# Patient Record
Sex: FEMALE | Race: WHITE | NOT HISPANIC OR LATINO | Employment: OTHER | URBAN - METROPOLITAN AREA
[De-identification: names, ages, dates, MRNs, and addresses within clinical notes are randomized per-mention and may not be internally consistent; named-entity substitution may affect disease eponyms.]

---

## 2018-06-27 ENCOUNTER — HOSPITAL ENCOUNTER (OUTPATIENT)
Dept: CARDIOLOGY | Facility: HOSPITAL | Age: 83
Discharge: HOME | End: 2018-06-27
Attending: INTERNAL MEDICINE | Admitting: INTERNAL MEDICINE

## 2018-08-18 ENCOUNTER — HOSPITAL ENCOUNTER (OUTPATIENT)
Dept: URGENT CARE | Facility: CLINIC | Age: 83
Setting detail: SPECIMEN
Discharge: HOME | End: 2018-08-18
Attending: FAMILY MEDICINE | Admitting: FAMILY MEDICINE

## 2018-08-18 LAB
BACTERIA SPEC AEROBE CULT: NORMAL
Lab: NORMAL
MICRO REPORT STATUS: NORMAL
SPECIMEN SOURCE: NORMAL

## 2018-12-28 ENCOUNTER — HOSPITAL ENCOUNTER (OUTPATIENT)
Dept: OTHER | Facility: HOSPITAL | Age: 83
Discharge: HOME | End: 2018-12-28
Attending: OBSTETRICS & GYNECOLOGY | Admitting: OBSTETRICS & GYNECOLOGY

## 2018-12-28 LAB
ABO + RH BLD: NORMAL
ANION GAP SERPL CALC-SCNC: 12.8 MMOL/L (ref 10–20)
ARMBAND: NORMAL
BASOPHILS # BLD AUTO: 0.1 10*3/UL (ref 0–0.2)
BASOPHILS NFR BLD AUTO: 1 % (ref 0–2)
BILIRUB UR QL STRIP: NEGATIVE MG/DL
BLD COMPONENT TYPE: NORMAL
BLD GP AB SCN SERPL QL: NEGATIVE
BUN SERPL-MCNC: 20 MG/DL (ref 8–20)
BUN/CREAT SERPL: 15.4 (ref 5.4–26.2)
CALCIUM SERPL-MCNC: 9.1 MG/DL (ref 8.9–10.3)
CASTS URNS QL MICRO: NORMAL /[LPF]
CHLORIDE SERPL-SCNC: 104 MMOL/L (ref 101–111)
COLOR UR: YELLOW
CONV BACTERIA IN URINE MICRO: NEGATIVE
CONV CLARITY OF URINE: CLEAR
CONV CO2: 24 MMOL/L (ref 22–32)
CONV HYALINE CASTS IN URINE MICRO: 1 /[LPF] (ref 0–5)
CONV PROTEIN IN URINE BY AUTOMATED TEST STRIP: NEGATIVE MG/DL
CONV SMALL ROUND CELLS: NORMAL /[HPF]
CONV UROBILINOGEN IN URINE BY AUTOMATED TEST STRIP: 0.2 MG/DL
CREAT UR-MCNC: 1.3 MG/DL (ref 0.4–1)
CROSSMATCH EXPIRATION: NORMAL
CULTURE INDICATED?: NORMAL
DIFFERENTIAL METHOD BLD: (no result)
EOSINOPHIL # BLD AUTO: 0.3 10*3/UL (ref 0–0.3)
EOSINOPHIL # BLD AUTO: 6 % (ref 0–3)
ERYTHROCYTE [DISTWIDTH] IN BLOOD BY AUTOMATED COUNT: 13.7 % (ref 11.5–14.5)
GLUCOSE SERPL-MCNC: 89 MG/DL (ref 65–99)
GLUCOSE UR QL: NEGATIVE MG/DL
HCT VFR BLD AUTO: 40.5 % (ref 35–49)
HGB BLD-MCNC: 13.5 G/DL (ref 12–15)
HGB UR QL STRIP: NEGATIVE
KETONES UR QL STRIP: NEGATIVE MG/DL
LEUKOCYTE ESTERASE UR QL STRIP: NEGATIVE
LYMPHOCYTES # BLD AUTO: 1.2 10*3/UL (ref 0.8–4.8)
LYMPHOCYTES NFR BLD AUTO: 23 % (ref 18–42)
MCH RBC QN AUTO: 32.2 PG (ref 26–32)
MCHC RBC AUTO-ENTMCNC: 33.4 G/DL (ref 32–36)
MCV RBC AUTO: 96.5 FL (ref 80–94)
MONOCYTES # BLD AUTO: 0.5 10*3/UL (ref 0.1–1.3)
MONOCYTES NFR BLD AUTO: 10 % (ref 2–11)
NEUTROPHILS # BLD AUTO: 3.1 10*3/UL (ref 2.3–8.6)
NEUTROPHILS NFR BLD AUTO: 60 % (ref 50–75)
NITRITE UR QL STRIP: NEGATIVE
NRBC BLD AUTO-RTO: 0 /100{WBCS}
NRBC/RBC NFR BLD MANUAL: 0 10*3/UL
PH UR STRIP.AUTO: 5.5 [PH] (ref 4.5–8)
PLATELET # BLD AUTO: 193 10*3/UL (ref 150–450)
PMV BLD AUTO: 10 FL (ref 7.4–10.4)
POTASSIUM SERPL-SCNC: 3.8 MMOL/L (ref 3.6–5.1)
RBC # BLD AUTO: 4.2 10*6/UL (ref 4–5.4)
RBC #/AREA URNS HPF: 1 /[HPF] (ref 0–3)
SODIUM SERPL-SCNC: 137 MMOL/L (ref 136–144)
SP GR UR: 1.01 (ref 1–1.03)
SPERM URNS QL MICRO: NORMAL /[HPF]
SQUAMOUS SPT QL MICRO: 4 /[HPF] (ref 0–5)
UNIDENT CRYS URNS QL MICRO: NORMAL /[HPF]
WBC # BLD AUTO: 5.2 10*3/UL (ref 4.5–11.5)
WBC #/AREA URNS HPF: 0 /[HPF] (ref 0–5)
YEAST SPEC QL WET PREP: NORMAL /[HPF]

## 2019-01-10 ENCOUNTER — HOSPITAL ENCOUNTER (OUTPATIENT)
Dept: LAB | Facility: HOSPITAL | Age: 84
Discharge: HOME | End: 2019-01-10
Attending: ANESTHESIOLOGY | Admitting: ANESTHESIOLOGY

## 2019-01-10 LAB
ABO + RH BLD: NORMAL
ARMBAND: NORMAL
BLD COMPONENT TYPE: NORMAL
BLD GP AB SCN SERPL QL: NEGATIVE
CROSSMATCH EXPIRATION: NORMAL

## 2019-01-18 ENCOUNTER — HOSPITAL ENCOUNTER (OUTPATIENT)
Dept: OTHER | Facility: HOSPITAL | Age: 84
Setting detail: SPECIMEN
Discharge: HOME | End: 2019-01-18
Attending: OBSTETRICS & GYNECOLOGY | Admitting: OBSTETRICS & GYNECOLOGY

## 2019-01-18 LAB
BASOPHILS # BLD AUTO: 0 10*3/UL (ref 0–0.2)
BASOPHILS NFR BLD AUTO: 0 % (ref 0–2)
DIFFERENTIAL METHOD BLD: (no result)
EOSINOPHIL # BLD AUTO: 0 % (ref 0–3)
EOSINOPHIL # BLD AUTO: 0 10*3/UL (ref 0–0.3)
ERYTHROCYTE [DISTWIDTH] IN BLOOD BY AUTOMATED COUNT: 13.8 % (ref 11.5–14.5)
HCT VFR BLD AUTO: 35.4 % (ref 35–49)
HGB BLD-MCNC: 11.6 G/DL (ref 12–15)
LYMPHOCYTES # BLD AUTO: 1.1 10*3/UL (ref 0.8–4.8)
LYMPHOCYTES NFR BLD AUTO: 9 % (ref 18–42)
MCH RBC QN AUTO: 31.5 PG (ref 26–32)
MCHC RBC AUTO-ENTMCNC: 32.9 G/DL (ref 32–36)
MCV RBC AUTO: 95.8 FL (ref 80–94)
MONOCYTES # BLD AUTO: 0.8 10*3/UL (ref 0.1–1.3)
MONOCYTES NFR BLD AUTO: 7 % (ref 2–11)
NEUTROPHILS # BLD AUTO: 10.7 10*3/UL (ref 2.3–8.6)
NEUTROPHILS NFR BLD AUTO: 84 % (ref 50–75)
NRBC BLD AUTO-RTO: 0 /100{WBCS}
NRBC/RBC NFR BLD MANUAL: 0 10*3/UL
PLATELET # BLD AUTO: 173 10*3/UL (ref 150–450)
PMV BLD AUTO: 10.3 FL (ref 7.4–10.4)
RBC # BLD AUTO: 3.69 10*6/UL (ref 4–5.4)
WBC # BLD AUTO: 12.7 10*3/UL (ref 4.5–11.5)

## 2019-07-02 PROBLEM — E78.5 HYPERLIPIDEMIA: Status: ACTIVE | Noted: 2019-07-02

## 2019-07-02 PROBLEM — S61.411A LACERATION WITHOUT FOREIGN BODY OF RIGHT HAND, INITIAL ENCOUNTER: Status: ACTIVE | Noted: 2018-10-27

## 2019-07-02 PROBLEM — I25.10 CORONARY ARTERY DISEASE: Status: ACTIVE | Noted: 2019-07-02

## 2019-07-02 PROBLEM — I10 HYPERTENSION: Status: ACTIVE | Noted: 2019-07-02

## 2019-07-10 ENCOUNTER — APPOINTMENT (OUTPATIENT)
Dept: MRI IMAGING | Facility: HOSPITAL | Age: 84
End: 2019-07-10

## 2019-07-10 ENCOUNTER — APPOINTMENT (OUTPATIENT)
Dept: CT IMAGING | Facility: HOSPITAL | Age: 84
End: 2019-07-10

## 2019-07-10 ENCOUNTER — APPOINTMENT (OUTPATIENT)
Dept: GENERAL RADIOLOGY | Facility: HOSPITAL | Age: 84
End: 2019-07-10

## 2019-07-10 ENCOUNTER — HOSPITAL ENCOUNTER (OUTPATIENT)
Facility: HOSPITAL | Age: 84
Setting detail: OBSERVATION
Discharge: HOME | End: 2019-07-11
Attending: NURSE PRACTITIONER | Admitting: HOSPITALIST

## 2019-07-10 DIAGNOSIS — R42 DIZZY: Primary | ICD-10-CM

## 2019-07-10 DIAGNOSIS — R06.09 OTHER FORM OF DYSPNEA: ICD-10-CM

## 2019-07-10 DIAGNOSIS — R26.81 UNSTEADY GAIT: ICD-10-CM

## 2019-07-10 DIAGNOSIS — R29.898 WEAKNESS OF BOTH LOWER EXTREMITIES: ICD-10-CM

## 2019-07-10 PROBLEM — K21.9 GERD WITHOUT ESOPHAGITIS: Chronic | Status: ACTIVE | Noted: 2019-07-10

## 2019-07-10 PROBLEM — R73.9 ACUTE HYPERGLYCEMIA: Status: ACTIVE | Noted: 2019-07-10

## 2019-07-10 PROBLEM — R06.00 DYSPNEA: Status: ACTIVE | Noted: 2019-07-10

## 2019-07-10 PROBLEM — S61.411A LACERATION WITHOUT FOREIGN BODY OF RIGHT HAND, INITIAL ENCOUNTER: Status: RESOLVED | Noted: 2018-10-27 | Resolved: 2019-07-10

## 2019-07-10 PROBLEM — I10 HYPERTENSION: Chronic | Status: ACTIVE | Noted: 2019-07-02

## 2019-07-10 PROBLEM — E78.5 HYPERLIPIDEMIA: Chronic | Status: ACTIVE | Noted: 2019-07-02

## 2019-07-10 PROBLEM — N18.30 CKD (CHRONIC KIDNEY DISEASE) STAGE 3, GFR 30-59 ML/MIN: Chronic | Status: ACTIVE | Noted: 2019-07-10

## 2019-07-10 PROBLEM — K06.9: Chronic | Status: ACTIVE | Noted: 2019-07-10

## 2019-07-10 PROBLEM — L43.9: Chronic | Status: ACTIVE | Noted: 2019-07-10

## 2019-07-10 PROBLEM — I25.10 CORONARY ARTERY DISEASE: Chronic | Status: ACTIVE | Noted: 2019-07-02

## 2019-07-10 PROBLEM — E03.9 HYPOTHYROIDISM: Chronic | Status: ACTIVE | Noted: 2019-07-10

## 2019-07-10 LAB
ALBUMIN SERPL-MCNC: 3.7 G/DL (ref 3.5–4.8)
ALBUMIN/GLOB SERPL: 1.2 G/DL (ref 1–1.7)
ALP SERPL-CCNC: 66 U/L (ref 32–91)
ALT SERPL W P-5'-P-CCNC: 17 U/L (ref 14–54)
ANION GAP SERPL CALCULATED.3IONS-SCNC: 14.1 MMOL/L (ref 5–15)
ARTICHOKE IGE QN: 113 MG/DL (ref 0–100)
AST SERPL-CCNC: 21 U/L (ref 15–41)
BASOPHILS # BLD AUTO: 0.1 10*3/MM3 (ref 0–0.2)
BASOPHILS NFR BLD AUTO: 1.2 % (ref 0–1.5)
BILIRUB SERPL-MCNC: 0.6 MG/DL (ref 0.3–1.2)
BUN BLD-MCNC: 24 MG/DL (ref 8–20)
BUN/CREAT SERPL: 16 (ref 5.4–26.2)
CALCIUM SPEC-SCNC: 9 MG/DL (ref 8.9–10.3)
CHLORIDE SERPL-SCNC: 103 MMOL/L (ref 101–111)
CHOLEST SERPL-MCNC: 185 MG/DL
CO2 SERPL-SCNC: 23 MMOL/L (ref 22–32)
CREAT BLD-MCNC: 1.5 MG/DL (ref 0.4–1)
DEPRECATED RDW RBC AUTO: 46.4 FL (ref 37–54)
EOSINOPHIL # BLD AUTO: 0.3 10*3/MM3 (ref 0–0.4)
EOSINOPHIL NFR BLD AUTO: 5.5 % (ref 0.3–6.2)
ERYTHROCYTE [DISTWIDTH] IN BLOOD BY AUTOMATED COUNT: 14.1 % (ref 12.3–15.4)
GFR SERPL CREATININE-BSD FRML MDRD: 33 ML/MIN/1.73
GLOBULIN UR ELPH-MCNC: 3 GM/DL (ref 2.5–3.8)
GLUCOSE BLD-MCNC: 161 MG/DL (ref 65–99)
HCT VFR BLD AUTO: 40.5 % (ref 34–46.6)
HDLC SERPL QL: 3.19
HDLC SERPL-MCNC: 58 MG/DL
HGB BLD-MCNC: 13.5 G/DL (ref 12–15.9)
LDLC/HDLC SERPL: 1.86 {RATIO}
LIPASE SERPL-CCNC: 54 U/L (ref 22–51)
LYMPHOCYTES # BLD AUTO: 1 10*3/MM3 (ref 0.7–3.1)
LYMPHOCYTES NFR BLD AUTO: 19.3 % (ref 19.6–45.3)
MCH RBC QN AUTO: 31.1 PG (ref 26.6–33)
MCHC RBC AUTO-ENTMCNC: 33.3 G/DL (ref 31.5–35.7)
MCV RBC AUTO: 93.3 FL (ref 79–97)
MONOCYTES # BLD AUTO: 0.4 10*3/MM3 (ref 0.1–0.9)
MONOCYTES NFR BLD AUTO: 8.4 % (ref 5–12)
NEUTROPHILS # BLD AUTO: 3.3 10*3/MM3 (ref 1.7–7)
NEUTROPHILS NFR BLD AUTO: 65.6 % (ref 42.7–76)
NRBC BLD AUTO-RTO: 0.1 /100 WBC (ref 0–0.2)
PLATELET # BLD AUTO: 168 10*3/MM3 (ref 140–450)
PMV BLD AUTO: 9.3 FL (ref 6–12)
POTASSIUM BLD-SCNC: 4.1 MMOL/L (ref 3.6–5.1)
PROT SERPL-MCNC: 6.7 G/DL (ref 6.1–7.9)
RBC # BLD AUTO: 4.34 10*6/MM3 (ref 3.77–5.28)
SODIUM BLD-SCNC: 136 MMOL/L (ref 136–144)
TRIGL SERPL-MCNC: 97 MG/DL
TROPONIN I SERPL-MCNC: <0.03 NG/ML (ref 0–0.03)
VLDLC SERPL-MCNC: 19.4 MG/DL
WBC NRBC COR # BLD: 5 10*3/MM3 (ref 3.4–10.8)

## 2019-07-10 PROCEDURE — 99285 EMERGENCY DEPT VISIT HI MDM: CPT

## 2019-07-10 PROCEDURE — 80061 LIPID PANEL: CPT | Performed by: NURSE PRACTITIONER

## 2019-07-10 PROCEDURE — 99220 PR INITIAL OBSERVATION CARE/DAY 70 MINUTES: CPT | Performed by: NURSE PRACTITIONER

## 2019-07-10 PROCEDURE — 71045 X-RAY EXAM CHEST 1 VIEW: CPT

## 2019-07-10 PROCEDURE — 70551 MRI BRAIN STEM W/O DYE: CPT

## 2019-07-10 PROCEDURE — G0378 HOSPITAL OBSERVATION PER HR: HCPCS

## 2019-07-10 PROCEDURE — 94799 UNLISTED PULMONARY SVC/PX: CPT

## 2019-07-10 PROCEDURE — 96361 HYDRATE IV INFUSION ADD-ON: CPT

## 2019-07-10 PROCEDURE — 96372 THER/PROPH/DIAG INJ SC/IM: CPT

## 2019-07-10 PROCEDURE — 80053 COMPREHEN METABOLIC PANEL: CPT | Performed by: NURSE PRACTITIONER

## 2019-07-10 PROCEDURE — 84484 ASSAY OF TROPONIN QUANT: CPT | Performed by: NURSE PRACTITIONER

## 2019-07-10 PROCEDURE — 96360 HYDRATION IV INFUSION INIT: CPT

## 2019-07-10 PROCEDURE — 93005 ELECTROCARDIOGRAM TRACING: CPT | Performed by: NURSE PRACTITIONER

## 2019-07-10 PROCEDURE — 25010000002 HEPARIN (PORCINE) PER 1000 UNITS: Performed by: NURSE PRACTITIONER

## 2019-07-10 PROCEDURE — 97161 PT EVAL LOW COMPLEX 20 MIN: CPT

## 2019-07-10 PROCEDURE — 85025 COMPLETE CBC W/AUTO DIFF WBC: CPT | Performed by: NURSE PRACTITIONER

## 2019-07-10 PROCEDURE — 70450 CT HEAD/BRAIN W/O DYE: CPT

## 2019-07-10 PROCEDURE — 83690 ASSAY OF LIPASE: CPT | Performed by: NURSE PRACTITIONER

## 2019-07-10 RX ORDER — ONDANSETRON 2 MG/ML
4 INJECTION INTRAMUSCULAR; INTRAVENOUS EVERY 6 HOURS PRN
Status: DISCONTINUED | OUTPATIENT
Start: 2019-07-10 | End: 2019-07-11 | Stop reason: HOSPADM

## 2019-07-10 RX ORDER — MECLIZINE HYDROCHLORIDE 25 MG/1
25 TABLET ORAL 3 TIMES DAILY PRN
Status: DISCONTINUED | OUTPATIENT
Start: 2019-07-10 | End: 2019-07-11 | Stop reason: HOSPADM

## 2019-07-10 RX ORDER — SODIUM CHLORIDE 0.9 % (FLUSH) 0.9 %
3-10 SYRINGE (ML) INJECTION AS NEEDED
Status: DISCONTINUED | OUTPATIENT
Start: 2019-07-10 | End: 2019-07-11 | Stop reason: HOSPADM

## 2019-07-10 RX ORDER — MECLIZINE HYDROCHLORIDE 25 MG/1
50 TABLET ORAL ONCE
Status: COMPLETED | OUTPATIENT
Start: 2019-07-10 | End: 2019-07-10

## 2019-07-10 RX ORDER — SODIUM CHLORIDE 0.9 % (FLUSH) 0.9 %
3 SYRINGE (ML) INJECTION EVERY 12 HOURS SCHEDULED
Status: DISCONTINUED | OUTPATIENT
Start: 2019-07-10 | End: 2019-07-11 | Stop reason: HOSPADM

## 2019-07-10 RX ORDER — LEVOTHYROXINE SODIUM 0.05 MG/1
50 TABLET ORAL
Status: DISCONTINUED | OUTPATIENT
Start: 2019-07-11 | End: 2019-07-11 | Stop reason: HOSPADM

## 2019-07-10 RX ORDER — SACCHAROMYCES BOULARDII 250 MG
250 CAPSULE ORAL DAILY
Status: DISCONTINUED | OUTPATIENT
Start: 2019-07-10 | End: 2019-07-11 | Stop reason: HOSPADM

## 2019-07-10 RX ORDER — SIMVASTATIN 20 MG
20 TABLET ORAL NIGHTLY
COMMUNITY

## 2019-07-10 RX ORDER — RANITIDINE 150 MG/1
150 TABLET ORAL DAILY
COMMUNITY
End: 2019-12-11

## 2019-07-10 RX ORDER — ONDANSETRON 4 MG/1
4 TABLET, FILM COATED ORAL EVERY 6 HOURS PRN
Status: DISCONTINUED | OUTPATIENT
Start: 2019-07-10 | End: 2019-07-11 | Stop reason: HOSPADM

## 2019-07-10 RX ORDER — ASPIRIN 81 MG/1
81 TABLET, CHEWABLE ORAL DAILY
COMMUNITY

## 2019-07-10 RX ORDER — ACETAMINOPHEN 650 MG/1
650 SUPPOSITORY RECTAL EVERY 4 HOURS PRN
Status: DISCONTINUED | OUTPATIENT
Start: 2019-07-10 | End: 2019-07-11 | Stop reason: HOSPADM

## 2019-07-10 RX ORDER — SODIUM CHLORIDE 0.9 % (FLUSH) 0.9 %
10 SYRINGE (ML) INJECTION AS NEEDED
Status: DISCONTINUED | OUTPATIENT
Start: 2019-07-10 | End: 2019-07-11 | Stop reason: HOSPADM

## 2019-07-10 RX ORDER — NITROGLYCERIN 0.4 MG/1
0.4 TABLET SUBLINGUAL
Status: DISCONTINUED | OUTPATIENT
Start: 2019-07-10 | End: 2019-07-11 | Stop reason: HOSPADM

## 2019-07-10 RX ORDER — MULTIPLE VITAMINS W/ MINERALS TAB 9MG-400MCG
1 TAB ORAL DAILY
COMMUNITY

## 2019-07-10 RX ORDER — SACCHAROMYCES BOULARDII 250 MG
250 CAPSULE ORAL DAILY
COMMUNITY
End: 2020-07-08

## 2019-07-10 RX ORDER — ALUMINA, MAGNESIA, AND SIMETHICONE 2400; 2400; 240 MG/30ML; MG/30ML; MG/30ML
15 SUSPENSION ORAL EVERY 6 HOURS PRN
Status: DISCONTINUED | OUTPATIENT
Start: 2019-07-10 | End: 2019-07-11 | Stop reason: HOSPADM

## 2019-07-10 RX ORDER — SODIUM CHLORIDE 9 MG/ML
100 INJECTION, SOLUTION INTRAVENOUS CONTINUOUS
Status: DISCONTINUED | OUTPATIENT
Start: 2019-07-10 | End: 2019-07-11 | Stop reason: HOSPADM

## 2019-07-10 RX ORDER — ASPIRIN 81 MG/1
81 TABLET, CHEWABLE ORAL DAILY
Status: DISCONTINUED | OUTPATIENT
Start: 2019-07-10 | End: 2019-07-11 | Stop reason: HOSPADM

## 2019-07-10 RX ORDER — CHOLECALCIFEROL (VITAMIN D3) 125 MCG
5 CAPSULE ORAL NIGHTLY PRN
Status: DISCONTINUED | OUTPATIENT
Start: 2019-07-10 | End: 2019-07-11 | Stop reason: HOSPADM

## 2019-07-10 RX ORDER — ACETAMINOPHEN 325 MG/1
650 TABLET ORAL EVERY 4 HOURS PRN
Status: DISCONTINUED | OUTPATIENT
Start: 2019-07-10 | End: 2019-07-11 | Stop reason: HOSPADM

## 2019-07-10 RX ORDER — HEPARIN SODIUM 5000 [USP'U]/ML
5000 INJECTION, SOLUTION INTRAVENOUS; SUBCUTANEOUS EVERY 12 HOURS SCHEDULED
Status: DISCONTINUED | OUTPATIENT
Start: 2019-07-10 | End: 2019-07-11 | Stop reason: HOSPADM

## 2019-07-10 RX ORDER — ATENOLOL 25 MG/1
50 TABLET ORAL DAILY
COMMUNITY
End: 2022-05-05

## 2019-07-10 RX ORDER — FAMOTIDINE 20 MG/1
20 TABLET, FILM COATED ORAL DAILY
Status: DISCONTINUED | OUTPATIENT
Start: 2019-07-10 | End: 2019-07-11 | Stop reason: HOSPADM

## 2019-07-10 RX ORDER — ATORVASTATIN CALCIUM 10 MG/1
10 TABLET, FILM COATED ORAL NIGHTLY
Status: DISCONTINUED | OUTPATIENT
Start: 2019-07-10 | End: 2019-07-11 | Stop reason: HOSPADM

## 2019-07-10 RX ORDER — ATENOLOL 25 MG/1
25 TABLET ORAL 2 TIMES DAILY
Status: DISCONTINUED | OUTPATIENT
Start: 2019-07-10 | End: 2019-07-11 | Stop reason: HOSPADM

## 2019-07-10 RX ORDER — BISACODYL 10 MG
10 SUPPOSITORY, RECTAL RECTAL DAILY PRN
Status: DISCONTINUED | OUTPATIENT
Start: 2019-07-10 | End: 2019-07-11 | Stop reason: HOSPADM

## 2019-07-10 RX ADMIN — ACETAMINOPHEN 650 MG: 325 TABLET, FILM COATED ORAL at 23:34

## 2019-07-10 RX ADMIN — ATORVASTATIN CALCIUM 10 MG: 10 TABLET, FILM COATED ORAL at 20:13

## 2019-07-10 RX ADMIN — MECLIZINE HYDROCLORIDE 50 MG: 25 TABLET ORAL at 11:43

## 2019-07-10 RX ADMIN — HEPARIN SODIUM 5000 UNITS: 5000 INJECTION INTRAVENOUS; SUBCUTANEOUS at 20:13

## 2019-07-10 RX ADMIN — Medication 250 MG: at 15:24

## 2019-07-10 RX ADMIN — ATENOLOL 25 MG: 25 TABLET ORAL at 20:13

## 2019-07-10 RX ADMIN — SODIUM CHLORIDE 100 ML/HR: 900 INJECTION, SOLUTION INTRAVENOUS at 15:20

## 2019-07-10 RX ADMIN — ASPIRIN 81 MG 81 MG: 81 TABLET ORAL at 15:24

## 2019-07-10 NOTE — ED PROVIDER NOTES
Subjective   Patient is an 83-year-old female who states she woke up at 7:00 this morning feeling dizzy.  She states that she felt like the room was spinning and states that she ate a Sprite and a cracker and felt better she states her symptoms have resolved at this time.-He states that she felt fine yesterday and had appropriate eating and drinking.-He denies any chest pain or shortness of breath she denies headache.  He denies pain.            Review of Systems   Constitutional: Negative for chills, fatigue and fever.   HENT: Negative for congestion, tinnitus and trouble swallowing.    Eyes: Negative for photophobia, discharge and redness.   Respiratory: Negative for cough and shortness of breath.    Cardiovascular: Negative for chest pain and palpitations.   Gastrointestinal: Negative for abdominal pain, diarrhea, nausea and vomiting.   Genitourinary: Negative for dysuria, frequency and urgency.   Musculoskeletal: Negative for back pain, joint swelling and myalgias.   Skin: Negative for rash.   Neurological: Positive for dizziness. Negative for headaches.   Psychiatric/Behavioral: Negative for confusion.   All other systems reviewed and are negative.      Past Medical History:   Diagnosis Date   • Coronary artery disease    • Hyperlipidemia    • Hypertension    • Rotator cuff tear arthropathy of left shoulder     Left shoulder rotator tear        Allergies   Allergen Reactions   • Clindamycin Hcl Itching   • Tetracycline Itching       Past Surgical History:   Procedure Laterality Date   • BREAST MASS EXCISION Right     Breast tumor R   • CARDIAC CATHETERIZATION     • CHOLECYSTECTOMY     • ENDOSCOPY     • HEMORRHOIDECTOMY     • PARTIAL HYSTERECTOMY     • TONSILLECTOMY         Family History   Problem Relation Age of Onset   • Heart disease Sister         MI   • Hypertension Other    • Other Other         Bypass Surgery        Social History     Socioeconomic History   • Marital status:      Spouse name: Not  on file   • Number of children: Not on file   • Years of education: Not on file   • Highest education level: Not on file   Tobacco Use   • Smoking status: Never Smoker   Substance and Sexual Activity   • Alcohol use: No     Frequency: Never   • Drug use: No           Objective   Physical Exam   Constitutional: She is oriented to person, place, and time. She appears well-developed and well-nourished.   HENT:   Head: Normocephalic and atraumatic.   Eyes: Conjunctivae and EOM are normal. Pupils are equal, round, and reactive to light.   Neck: Normal range of motion. Neck supple.   Cardiovascular: Normal rate, regular rhythm, normal heart sounds and intact distal pulses.   Pulmonary/Chest: Effort normal and breath sounds normal. No respiratory distress. She has no wheezes.   Abdominal: Soft. Bowel sounds are normal. She exhibits no distension and no mass. There is no tenderness. There is no rebound and no guarding.   Musculoskeletal: Normal range of motion. She exhibits no deformity.   Neurological: She is alert and oriented to person, place, and time. She has normal strength. She displays normal reflexes. No cranial nerve deficit or sensory deficit. GCS eye subscore is 4. GCS verbal subscore is 5. GCS motor subscore is 6.   Skin: Skin is warm and dry. Capillary refill takes less than 2 seconds.   Psychiatric: She has a normal mood and affect. Her speech is normal and behavior is normal. Judgment and thought content normal. Cognition and memory are normal.       ECG 12 Lead    Date/Time: 7/10/2019 10:25 AM  Performed by: Janet Vargas APRN  Authorized by: Janet Vargas APRN   Interpreted by physician (Dr French)  Comparison: compared with previous ECG from 12/28/2018  Similar to previous ECG  Rhythm: sinus rhythm  Rate: normal  BPM: 68  QRS axis: normal  Conduction: conduction normal  ST Segments: ST segments normal  Clinical impression: normal ECG                 ED Course        Labs Reviewed    COMPREHENSIVE METABOLIC PANEL - Abnormal; Notable for the following components:       Result Value    Glucose 161 (*)     BUN 24 (*)     Creatinine 1.50 (*)     eGFR Non  Amer 33 (*)     All other components within normal limits    Narrative:     The MDRD GFR formula is only valid for adults with stable renal function between ages 18 and 70.   LIPASE - Abnormal; Notable for the following components:    Lipase 54 (*)     All other components within normal limits   CBC WITH AUTO DIFFERENTIAL - Abnormal; Notable for the following components:    Lymphocyte % 19.3 (*)     All other components within normal limits   TROPONIN (IN-HOUSE)   CBC AND DIFFERENTIAL    Narrative:     The following orders were created for panel order CBC & Differential.  Procedure                               Abnormality         Status                     ---------                               -----------         ------                     CBC Auto Differential[[id number]]        Abnormal            Final result                 Please view results for these tests on the individual orders.     Medications   sodium chloride 0.9 % flush 10 mL (not administered)   meclizine (ANTIVERT) tablet 50 mg (50 mg Oral Given 7/10/19 1143)     Ct Head Without Contrast    Result Date: 7/10/2019   1. No acute intracranial hemorrhage or mass effect mass effect. 2. Patchy periventricular and subcortical white matter low-attenuation, statistically representing age-indeterminate small vessel ischemic changes.  Electronically Signed By-Pop Heredia On:7/10/2019 9:35 AM This report was finalized on [id number] by  Pop Heredia, .    Xr Chest 1 View    Result Date: 7/10/2019  No acute cardiomegaly disease.  Electronically Signed By-Franklin Anne On:7/10/2019 9:14 AM This report was finalized on [id number] by  Franklin Anne, .              MDM    Patient had IV established and blood work was obtained labs and CAT scan were found to be within normal limits.   EKG was found to be within normal limits.-Patient continued to have dizziness upon standing and walking.-She will be admitted for further evaluation and treatment the patient and her family bedside were agreeable to this plan of care.    Patient  Care was discussed with Dr. Chester and Radha with the hospitalist and patient was agreeable to this plan of care.   Final diagnoses:   Dizzy   Other form of dyspnea            Janet Vargas, APRN  07/10/19 1242

## 2019-07-10 NOTE — PLAN OF CARE
Problem: Patient Care Overview  Goal: Plan of Care Review  Outcome: Ongoing (interventions implemented as appropriate)   07/10/19 1534   Coping/Psychosocial   Plan of Care Reviewed With patient;son   Plan of Care Review   Progress improving   OTHER   Outcome Summary Patient states minimal dizziness only with walking. Awaiting MRI     Goal: Individualization and Mutuality  Outcome: Ongoing (interventions implemented as appropriate)    Goal: Discharge Needs Assessment  Outcome: Ongoing (interventions implemented as appropriate)    Goal: Interprofessional Rounds/Family Conf  Outcome: Ongoing (interventions implemented as appropriate)      Problem: Fall Risk (Adult)  Goal: Identify Related Risk Factors and Signs and Symptoms  Outcome: Ongoing (interventions implemented as appropriate)   07/10/19 1534   Fall Risk (Adult)   Related Risk Factors (Fall Risk) age-related changes;gait/mobility problems   Signs and Symptoms (Fall Risk) presence of risk factors     Goal: Absence of Fall  Outcome: Ongoing (interventions implemented as appropriate)   07/10/19 1534   Fall Risk (Adult)   Absence of Fall making progress toward outcome       Problem: Activity Intolerance (Adult)  Goal: Identify Related Risk Factors and Signs and Symptoms  Outcome: Ongoing (interventions implemented as appropriate)    Goal: Activity Tolerance  Outcome: Ongoing (interventions implemented as appropriate)

## 2019-07-10 NOTE — PLAN OF CARE
Problem: Patient Care Overview  Goal: Plan of Care Review  Outcome: Ongoing (interventions implemented as appropriate)   07/10/19 2549   OTHER   Outcome Summary 84 y/o F who presented to ED this AM with report of imbalance and room spinning dizziness when she woke up this morning. Room spinning dizziness resolved within 20 min per report and has not recurred. Imbalance persists. Patient denies: ear fullness, ear pain, hearing loss, numbness, tingling, recent falls, history of imbalance, history of dizziness, upper respiratory infection. Denies lightheadedness. She lives alone, is indep. with ADLs and drives at baseline. Vision screen is (-). Based on balance assessment, patient relies heavily on vision for balance and also demonstrates postural instability with narrowing STEVEN. Patient with postural instability with dynamic gait. Not able to complete BPPV positional assessment due to pt needing to go to MRI. Will complete tomorrow AM. Recommding OPPT upon d/c due to decreased use of vestibular inputs placing her at risk for falls.

## 2019-07-10 NOTE — H&P
[facility] HOSPITALIST       PCP:  Gokul Piña MD  Cardiology:  Dr. Whittaker  Nephrology:  Dr. Webster  Urology:  Dr. Hartman     CHIEF COMPLAINT:     Unsteady gait, dizziness, and leg weakness      HISTORY OF PRESENT ILLNESS:    This is an 83-year-old  female with a history of CAD, HTN, HLD, hypothyroidism, and CKD who presented to the ED on 07/10/2019 with complaint of unsteady gait, dizziness, and leg weakness. The patient states she felt fine when she went to bed last night. She states this morning when she woke up and got up to go to the bathroom she was off balance. She denies falling. She states she lied back down because the room started to spin and she felt dizzy. She states after several minutes the dizziness past. She states when she tried to get back up her legs were very weak. She states her left leg felt weaker than her right leg. She denies any slurred speech, facial droop, headache, or visual disturbances. She denies fever, chills, chest pain, abdominal pain, nausea, vomiting, diarrhea, or urinary complaints. She reports shortness of breath with exertion, but states this has been intermittent for the past month. She states she had a normal stress test done last year. She also reports she had an ultrasound of her carotid arteries done at [facility] last year. She is unsure of the results. She denies a history of vertigo, orthostatic hypotension, or stroke.     Review of records:  6/27/18 stress Myoview negative for ischemia, EF 71%  2018 Carotid Duplex done at [facility]---report requested    Upon arrival to the ER the patient was given Antivert 50 mg PO. Her troponin was negative, EKG showed sinus rhythm, CT head showed no acute abnormalities, and CXR showed no acute findings. Her glucose was elevated at 161. Her BUN/Cr appear at baseline. She was admitted for further evaluation.       Past Medical History:   Diagnosis Date   • CKD (chronic kidney disease)  stage 3, GFR 30-59 ml/min (CMS/[facility])    • Coronary artery disease    • GERD (gastroesophageal reflux disease)    • Hyperlipidemia    • Hypertension    • Hypothyroidism    • Rotator cuff tear, left      Past Surgical History:   Procedure Laterality Date   • BREAST MASS EXCISION Right     Breast tumor R   • CARDIAC CATHETERIZATION     • CHOLECYSTECTOMY     • ENDOSCOPY     • HEMORRHOIDECTOMY     • PARTIAL HYSTERECTOMY     • SHOULDER ARTHROSCOPY W/ ROTATOR CUFF REPAIR Left    • TONSILLECTOMY       Family History   Problem Relation Age of Onset   • Heart disease Sister         MI   • Hypertension Other    • Other Other         Bypass Surgery      Social History     Tobacco Use   • Smoking status: Never Smoker   • Smokeless tobacco: Never Used   Substance Use Topics   • Alcohol use: No     Frequency: Never   • Drug use: No       Medications Prior to Admission   Medication Sig Dispense Refill Last Dose   • aspirin 81 MG chewable tablet Chew 81 mg Daily.   7/9/2019 at Unknown time   • atenolol (TENORMIN) 25 MG tablet Take 25 mg by mouth 2 (Two) Times a Day.   7/9/2019 at Unknown time   • calcium carbonate-vitamin d (CALTRATE 600+D) 600-400 MG-UNIT per tablet Take 1 tablet by mouth Daily.   7/9/2019 at Unknown time   • Coenzyme Q10 (COQ-10) 200 MG capsule Take 1 capsule by mouth Daily.   7/9/2019 at Unknown time   • conjugated estrogens (PREMARIN) 0.625 MG/GM vaginal cream Insert 1 g into the vagina 3 (Three) Times a Week. Mon,Wed,Fri 7/9/2019 at Unknown time   • levothyroxine (SYNTHROID, LEVOTHROID) 50 MCG tablet LEVOTHYROXINE SODIUM 50 MCG TABS   7/9/2019 at Unknown time   • Multiple Vitamins-Minerals (MULTIVITAMIN WITH MINERALS) tablet tablet Take 1 tablet by mouth Daily.   7/9/2019 at Unknown time   • raNITIdine (ZANTAC) 150 MG tablet Take 150 mg by mouth Daily.   7/9/2019 at Unknown time   • saccharomyces boulardii (FLORASTOR) 250 MG capsule Take 250 mg by mouth Daily.   7/9/2019 at Unknown time   • simvastatin (ZOCOR)  "20 MG tablet Take 20 mg by mouth Every Night.   7/9/2019 at Unknown time   • Tacrolimus (PROGRAF PO) Take 10 mL by mouth 4 (Four) Times a Day. Tacrolimus Liquid 1mg/1,000mL   Swish and Spit 10mL QID (pt usually uses BID-TID)  Verified per [facility] pharmacy where med is compounded. Instructed family to bring med.   7/9/2019 at Unknown time       Allergies:  Clindamycin hcl and Tetracycline    Immunization History   Administered Date(s) Administered   • Td 06/15/2012         REVIEW OF SYSTEMS:    Review of Systems   Constitutional: Negative for chills and fever.   Respiratory: Positive for shortness of breath.    Cardiovascular: Negative for chest pain.   Gastrointestinal: Negative for abdominal pain, diarrhea, nausea and vomiting.   Musculoskeletal: Positive for gait problem.   Neurological: Positive for dizziness and weakness.       Vital Signs  Temp:  [97.3 °F (36.3 °C)-97.6 °F (36.4 °C)] 97.6 °F (36.4 °C)  Heart Rate:  [65-84] 72  Resp:  [14-16] 16  BP: (146-180)/(60-94) 166/84    Flowsheet Rows      First Filed Value   Admission Height  165.1 cm (65\") Documented at 07/10/2019 0814   Admission Weight  72.7 kg (160 lb 4.4 oz) Documented at 07/10/2019 0814           Physical Exam:  Physical Exam   Constitutional: She is oriented to person, place, and time and well-developed, well-nourished, and in no distress.   HENT:   Head: Normocephalic.   Eyes: Conjunctivae and EOM are normal. Pupils are equal, round, and reactive to light.   Neck: Normal range of motion. Neck supple.   Cardiovascular: Normal rate, regular rhythm, normal heart sounds and intact distal pulses.   Pulmonary/Chest: Effort normal and breath sounds normal.   Abdominal: Soft. Bowel sounds are normal. She exhibits no distension. There is no tenderness.   Musculoskeletal: Normal range of motion. She exhibits no edema.   Neurological: She is alert and oriented to person, place, and time. She displays weakness. GCS score is 15.   Weakness of BLE, L>R.  "   Skin: Skin is warm and dry. No rash noted. No erythema.   Psychiatric: Mood, memory, affect and judgment normal.         Results Review:   I reviewed the patient's new clinical results.    Lab Results (most recent)     Procedure Component Value Units Date/Time    Troponin [[id number]]  (Normal) Collected:  07/10/19 1157    Specimen:  Blood Updated:  07/10/19 1253     Troponin I <0.030 ng/mL     Narrative:       Troponin I Reference Range:    0.00-0.03  Negative.  Repeat testing in 4-6 hours if clinically indicated.    0.04-0.29  Suspicious for myocardial injury. Serial measurements and clinical  correlation may be necessary to confirm or exclude diagnosis of acute  coronary syndrome.  Repeat testing in 4-6 hours if indicated.     >0.29 Consistent with myocardial injury.  Recommend clinical and laboratory correlation.     Results my be falsely decreased if patient taking Biotin.     Comprehensive Metabolic Panel [[id number]]  (Abnormal) Collected:  07/10/19 0845    Specimen:  Blood Updated:  07/10/19 0916     Glucose 161 mg/dL      BUN 24 mg/dL      Creatinine 1.50 mg/dL      Sodium 136 mmol/L      Potassium 4.1 mmol/L      Chloride 103 mmol/L      CO2 23.0 mmol/L      Calcium 9.0 mg/dL      Total Protein 6.7 g/dL      Albumin 3.70 g/dL      ALT (SGPT) 17 U/L      AST (SGOT) 21 U/L      Alkaline Phosphatase 66 U/L      Total Bilirubin 0.6 mg/dL      eGFR Non African Amer 33 mL/min/1.73      Globulin 3.0 gm/dL      A/G Ratio 1.2 g/dL      BUN/Creatinine Ratio 16.0     Anion Gap 14.1 mmol/L     Narrative:       The MDRD GFR formula is only valid for adults with stable renal function between ages 18 and 70.    Lipase [[id number]]  (Abnormal) Collected:  07/10/19 0845    Specimen:  Blood Updated:  07/10/19 0916     Lipase 54 U/L     CBC & Differential [[id number]] Collected:  07/10/19 0845    Specimen:  Blood Updated:  07/10/19 0853    Narrative:       The following orders were created for panel order CBC &  Differential.  Procedure                               Abnormality         Status                     ---------                               -----------         ------                     CBC Auto Differential[[id number]]        Abnormal            Final result                 Please view results for these tests on the individual orders.    CBC Auto Differential [[id number]]  (Abnormal) Collected:  07/10/19 0845    Specimen:  Blood Updated:  07/10/19 0853     WBC 5.00 10*3/mm3      RBC 4.34 10*6/mm3      Hemoglobin 13.5 g/dL      Hematocrit 40.5 %      MCV 93.3 fL      MCH 31.1 pg      MCHC 33.3 g/dL      RDW 14.1 %      RDW-SD 46.4 fl      MPV 9.3 fL      Platelets 168 10*3/mm3      Neutrophil % 65.6 %      Lymphocyte % 19.3 %      Monocyte % 8.4 %      Eosinophil % 5.5 %      Basophil % 1.2 %      Neutrophils, Absolute 3.30 10*3/mm3      Lymphocytes, Absolute 1.00 10*3/mm3      Monocytes, Absolute 0.40 10*3/mm3      Eosinophils, Absolute 0.30 10*3/mm3      Basophils, Absolute 0.10 10*3/mm3      nRBC 0.1 /100 WBC           Imaging Results (most recent)     Procedure Component Value Units Date/Time    CT Head Without Contrast [[id number]] Collected:  07/10/19 0933     Updated:  07/10/19 0937    Narrative:       CT HEAD WO CONTRAST-     Date of Exam: 7/10/2019 9:24 AM     Indication: dizzy.     Comparison: None available.     Technique:  Without contrast, contiguous axial CT images of the head  were obtained from skull base to vertex.  Coronal and sagittal  reconstructions were performed.  Automated exposure control and  iterative reconstruction methods were used.     FINDINGS  No acute intracranial hemorrhage or mass/mass effect. The ventricles and  sulci are appropriate for age. The basilar cisterns are patent. Patchy  periventricular and subcortical white matter low-attenuation,  statistically representing age-indeterminate small vessel ischemic  changes. Gray-white matter differentiation is otherwise  grossly  maintained. Intracranial vascular calcifications, consistent with  atherosclerotic disease. Evidence of bilateral cataract surgery. Limited  imaging of the previous sinuses and mastoid air cells is unremarkable.  No acute osseous abnormality is identified.       Impression:          1. No acute intracranial hemorrhage or mass effect mass effect.  2. Patchy periventricular and subcortical white matter low-attenuation,  statistically representing age-indeterminate small vessel ischemic  changes.     Electronically Signed By-Pop Heredia On:7/10/2019 9:35 AM  This report was finalized on [id number] by  Pop Heredia, .    XR Chest 1 View [[id number]] Collected:  07/10/19 0912     Updated:  07/10/19 0916    Narrative:       DATE OF EXAM:  7/10/2019 9:03 AM     PROCEDURE:  XR CHEST 1 VW-     INDICATIONS:  dizzy     COMPARISON:  12/20/2018     TECHNIQUE:   Single radiographic AP view of the chest was obtained.     FINDINGS:  Heart size and pulmonary vessels are within normal limits. Lungs are  clear bilaterally. There are no pleural effusions. Bony structures are  unremarkable.        Impression:       No acute cardiomegaly disease.     Electronically Signed By-Franklin Anne On:7/10/2019 9:14 AM  This report was finalized on [id number] by  Franklin Anne, .            ECG/EMG Results (most recent)     Procedure Component Value Units Date/Time    ECG 12 Lead [[id number]] Collected:  07/10/19 0825     Updated:  07/10/19 0829    Narrative:       HEART RATE= 68  bpm  RR Interval= 880  ms  LA Interval= 149  ms  P Horizontal Axis= 52  deg  P Front Axis= 45  deg  QRSD Interval= 90  ms  QT Interval= 391  ms  QRS Axis= -11  deg  T Wave Axis= 28  deg  - NORMAL ECG -  Sinus rhythm  Electronically Signed By:   Date and Time of Study: 2019-07-10 08:25:34    ECG 12 Lead [[id number]] Resulted:  07/10/19 1247     Updated:  07/10/19 1247               Assessment/Plan       Unsteady gait    Dizzy    Weakness of both lower  extremities    Dyspnea on exertion    Acute hyperglycemia    Coronary artery disease    Hyperlipidemia    Hypertension    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    Hypothyroidism    GERD without esophagitis    CKD (chronic kidney disease) stage 3, GFR 30-59 ml/min (CMS/HCC)      Unsteady gait with dizziness and weakness of both lower extremities  -LLE weakness > RLE  -CTH:  No acute findings  -obtain MRI brain to r/o CVA  -copy of carotid doppler report done at [facility] requested  -check orthostatic vital signs  -serial troponin   -meclizine TID PRN  -fall risk precautions  -PT to eval and treat     Dyspnea on exertion  -etiology unclear  -6/2018 stress Myoview negative  -troponin and EKG in ER negative    Acute hyperglycemia without h/o DM  -non-fasting glucose  -check A1c  -accu checks AC&HS    Coronary artery disease / Hyperlipidemia / Essential Hypertension, chronic, controlled  -continue home aspirin, atenolol, and statin    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

## 2019-07-10 NOTE — THERAPY EVALUATION
Acute Care - Physical Therapy Initial Evaluation   [facility]     Patient Name: Lucy Macias  : 1935  MRN: [id number]  Today's Date: 7/10/2019                Admit Date: 7/10/2019    Visit Dx:     ICD-10-CM ICD-9-CM   1. Dizzy R42 780.4   2. 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   • Hypothyroidism   • GERD without esophagitis   • CKD (chronic kidney disease) stage 3, GFR 30-59 ml/min (CMS/HCC)   • Acute hyperglycemia     Past Medical History:   Diagnosis Date   • CKD (chronic kidney disease) stage 3, GFR 30-59 ml/min (CMS/HCC)    • Coronary artery disease    • GERD (gastroesophageal reflux disease)    • Hyperlipidemia    • Hypertension    • Hypothyroidism    • Rotator cuff tear, left      Past Surgical History:   Procedure Laterality Date   • BREAST MASS EXCISION Right     Breast tumor R   • CARDIAC CATHETERIZATION     • CHOLECYSTECTOMY     • ENDOSCOPY     • HEMORRHOIDECTOMY     • PARTIAL HYSTERECTOMY     • SHOULDER ARTHROSCOPY W/ ROTATOR CUFF REPAIR Left    • TONSILLECTOMY          PT ASSESSMENT (last 12 hours)      Physical Therapy Evaluation     Row Name 07/10/19 1546          PT Evaluation Time/Intention    Comment  Patient reports that this morning when she woke up, she walked to the bathroom and she was off balance. When she laid back down, the room was spinning. This lasted 20 min and she has not experienced the spinning sensation again. Denies having any type of dizziness prior. Denies any recent falls. Denies any upper respiratory issues. Denies numbness/tingling. Denies ringing in ears, fullness, ear pain, or hearing loss.   -SS     Row Name 07/10/19 1546          General Information    Patient/Family Observations  supine in bed, son and grandson present   -     Prior Level of Function  independent:;driving;home management;ADL's;gait;community mobility plays cards multiple times a week  -     Pertinent History of Current Functional Problem  82 y/o F who presented to ED following episode of imbalance and room spinning dizziness this AM.   -[facility] Name 07/10/19 1546          Relationship/Environment    Lives With  alone  -SS     Row Name 07/10/19 1546          Resource/Environmental Concerns    Current Living Arrangements  home/apartment/condo 1 story with basement, no INDIO  -[facility] Name 07/10/19 1546          Cognitive Assessment/Intervention- PT/OT    Orientation Status (Cognition)  oriented x 4  -[facility] Name 07/10/19 1546          Bed Mobility Assessment/Treatment    Comment (Bed Mobility)  performs supine to sit with SBA  -[facility] Name 07/10/19 1546          Transfer Assessment/Treatment    Comment (Transfers)  Transfers with CGA  -SS     Row Name 07/10/19 1546          Gait/Stairs Assessment/Training    [place] Level (Gait)  contact guard  -     Distance in Feet (Gait)  200  -     Comment (Gait/Stairs)  mild postural instability with typical gait; moderate postural instability with turns and head turns, significant postural instabilty with head nods. Decreased gait speed as a compensation. Significant difficulty with backward gait. Veering to R with eyes closed gait.   -     Row Name 07/10/19 1546          General ROM    GENERAL ROM COMMENTS  grossly WFL  -SS     Row Name 07/10/19 1546          MMT (Manual Muscle Testing)    General MMT Comments  UEs 4/5; LEs 4/5 per seated strength assessment  -[facility] Name 07/10/19 1546          Motor Assessment/Intervention    Additional Documentation  Balance (Group);Fine Motor Testing & Training (Group);Gross Motor Coordination (Group)  -[facility] Name 07/10/19 1546          Gross Motor Coordination    Gross Motor Skill, Impairments Detail  normal LE rapid alternating movements, increased time  required for finger to nose test   -SS     Row Name 07/10/19 1546          Balance    Balance  dynamic standing balance;static standing balance  -SS     Row Name 07/10/19 1546          Static Standing Balance    Comment (Unsupported Standing, Static Balance)  typical STEVEN eyes open: normal; typical STEVEN eyes closed: normal; narrow STEVEN eyes open: increased posural sway; narrow STEVEN eyes closed: loss of balance in 5 sec, able to recover with min A  -SS     Row Name 07/10/19 1546          Dynamic Standing Balance    Comment, Resists Mild Perturbations (Sitting, Dynamic Balance)  tandem stance immediate LOB bilaterally, increased difficulty with staggered stance with LOB in 10 sec  -SS     Row Name 07/10/19 1546          Sensory Assessment/Intervention    Sensory General Assessment  -- grossly intact and equal to light touch  -SS     Row Name 07/10/19 1546          Vision Assessment/Intervention    Vision Assessment Comment  smooth pursuit WNL; saccades- difficulty with attention; VOR normal; head trust test normal; head shaking nystagmus test normal  -SS     Row Name 07/10/19 1546          Pain Assessment    Additional Documentation  -- denies pain   -SS     Row Name 07/10/19 1546          Plan of Care Review    Plan of Care Reviewed With  patient;family  -SS     Row Name 07/10/19 1546          Physical Therapy Clinical Impression    Criteria for Skilled Interventions Met (PT Clinical Impression)  yes;treatment indicated  -     Impairments Found (describe specific impairments)  gait, locomotion, and balance  -     Rehab Potential (PT Clinical Summary)  good, to achieve stated therapy goals  -SS     Row Name 07/10/19 1546          Physical Therapy Goals    Transfer Goal Selection (PT)  transfer, PT goal 1  -     Gait Training Goal Selection (PT)  gait training, PT goal 1  -SS     Row Name 07/10/19 1546          Transfer Goal 1 (PT)    Activity/Assistive Device (Transfer Goal 1, PT)  transfers, all  -      [place]inson Level/Cues Needed (Transfer Goal 1, PT)  independent  -SS     Time Frame (Transfer Goal 1, PT)  long term goal (LTG);2 weeks  -     Row Name 07/10/19 1546          Gait Training Goal 1 (PT)    Activity/Assistive Device (Gait Training Goal 1, PT)  gait (walking locomotion)  -     [place]inson Level (Gait Training Goal 1, PT)  independent  -SS     Distance (Gait Goal 1, PT)  300  -SS     Time Frame (Gait Training Goal 1, PT)  long term goal (LTG);2 weeks  -       User Key  (r) = Recorded By, (t) = Taken By, (c) = Cosigned By    Initials Name Provider Type    SS Francine Milan, PT Physical Therapist        Physical Therapy Education     Title: PT OT SLP Therapies (Done)     Topic: Physical Therapy (Done)     Point: Mobility training (Done)     Learning Progress Summary           Patient Acceptance, E, VU by  at 7/10/2019  5:10 PM                               User Key     Initials Effective Dates Name Provider Type Discipline     06/19/19 -  Francine Milan, PT Physical Therapist PT              PT Recommendation and Plan  Anticipated Discharge Disposition (PT): home with OP services  Planned Therapy Interventions (PT Eval): balance training, gait training, home exercise program, neuromuscular re-education, patient/family education, vestibular therapy, transfer training, strengthening  Therapy Frequency (PT Clinical Impression): daily  Outcome Summary/Treatment Plan (PT)  Anticipated Discharge Disposition (PT): home with OP services  Plan of Care Reviewed With: patient, family  Outcome Summary: 82 y/o F who presented to ED this AM with report of imbalance and room spinning dizziness when she woke up this morning. Room spinning dizziness resolved within 20 min per report and has not recurred. Imbalance persists. Patient denies: ear fullness, ear pain, hearing loss, numbness, tingling, recent falls, history of imbalance, history of dizziness, upper respiratory infection. Denies  lightheadedness. She lives alone, is indep. with ADLs and drives at baseline. Vision screen is (-). Based on balance assessment, patient relies heavily on vision for balance and also demonstrates postural instability with narrowing STEVEN. Patient with postural instability with dynamic gait. Not able to complete BPPV positional assessment due to pt needing to go to MRI. Will complete tomorrow AM. Recommding OPPT upon d/c due to decreased use of vestibular inputs placing her at risk for falls.      Time Calculation:   PT Charges     Row Name 07/10/19 1710             Time Calculation    Start Time  1540  -SS      Stop Time  1625  -SS      Time Calculation (min)  45 min  -SS      PT Received On  07/10/19  -      PT - Next Appointment  07/11/19  -      PT Goal Re-Cert Due Date  07/24/19  -        User Key  (r) = Recorded By, (t) = Taken By, (c) = Cosigned By    Initials Name Provider Type    SS Francine Milan, PT Physical Therapist        Therapy Charges for Today     Code Description Service Date Service Provider Modifiers Qty    [id number] HC PT EVAL LOW COMPLEXITY 4 7/10/2019 Francine Milan, PT GP 1                 Francine Milan PT  7/10/2019

## 2019-07-11 VITALS
HEIGHT: 65 IN | OXYGEN SATURATION: 95 % | SYSTOLIC BLOOD PRESSURE: 148 MMHG | WEIGHT: 171.08 LBS | BODY MASS INDEX: 28.5 KG/M2 | HEART RATE: 68 BPM | DIASTOLIC BLOOD PRESSURE: 79 MMHG | TEMPERATURE: 98 F | RESPIRATION RATE: 18 BRPM

## 2019-07-11 PROBLEM — R06.09 DYSPNEA ON EXERTION: Status: RESOLVED | Noted: 2019-07-10 | Resolved: 2019-07-11

## 2019-07-11 PROBLEM — R29.898 WEAKNESS OF BOTH LOWER EXTREMITIES: Status: RESOLVED | Noted: 2019-07-10 | Resolved: 2019-07-11

## 2019-07-11 PROBLEM — R42 DIZZY: Status: RESOLVED | Noted: 2019-07-10 | Resolved: 2019-07-11

## 2019-07-11 LAB
ANION GAP SERPL CALCULATED.3IONS-SCNC: 11.5 MMOL/L (ref 5–15)
BASOPHILS # BLD AUTO: 0.1 10*3/MM3 (ref 0–0.2)
BASOPHILS NFR BLD AUTO: 1.7 % (ref 0–1.5)
BUN BLD-MCNC: 21 MG/DL (ref 8–20)
BUN/CREAT SERPL: 15 (ref 5.4–26.2)
CALCIUM SPEC-SCNC: 8.6 MG/DL (ref 8.9–10.3)
CHLORIDE SERPL-SCNC: 109 MMOL/L (ref 101–111)
CO2 SERPL-SCNC: 23 MMOL/L (ref 22–32)
CREAT BLD-MCNC: 1.4 MG/DL (ref 0.4–1)
DEPRECATED RDW RBC AUTO: 47.7 FL (ref 37–54)
EOSINOPHIL # BLD AUTO: 0.3 10*3/MM3 (ref 0–0.4)
EOSINOPHIL NFR BLD AUTO: 3.8 % (ref 0.3–6.2)
ERYTHROCYTE [DISTWIDTH] IN BLOOD BY AUTOMATED COUNT: 14.2 % (ref 12.3–15.4)
GFR SERPL CREATININE-BSD FRML MDRD: 36 ML/MIN/1.73
GLUCOSE BLD-MCNC: 108 MG/DL (ref 65–99)
HBA1C MFR BLD: 5.4 % (ref 3.5–5.6)
HCT VFR BLD AUTO: 38.8 % (ref 34–46.6)
HGB BLD-MCNC: 12.9 G/DL (ref 12–15.9)
LYMPHOCYTES # BLD AUTO: 1.9 10*3/MM3 (ref 0.7–3.1)
LYMPHOCYTES NFR BLD AUTO: 27.6 % (ref 19.6–45.3)
MCH RBC QN AUTO: 31.2 PG (ref 26.6–33)
MCHC RBC AUTO-ENTMCNC: 33.3 G/DL (ref 31.5–35.7)
MCV RBC AUTO: 93.8 FL (ref 79–97)
MONOCYTES # BLD AUTO: 0.6 10*3/MM3 (ref 0.1–0.9)
MONOCYTES NFR BLD AUTO: 9 % (ref 5–12)
NEUTROPHILS # BLD AUTO: 4 10*3/MM3 (ref 1.7–7)
NEUTROPHILS NFR BLD AUTO: 57.9 % (ref 42.7–76)
NRBC BLD AUTO-RTO: 0.2 /100 WBC (ref 0–0.2)
PLATELET # BLD AUTO: 170 10*3/MM3 (ref 140–450)
PMV BLD AUTO: 9.5 FL (ref 6–12)
POTASSIUM BLD-SCNC: 4.5 MMOL/L (ref 3.6–5.1)
RBC # BLD AUTO: 4.14 10*6/MM3 (ref 3.77–5.28)
SODIUM BLD-SCNC: 139 MMOL/L (ref 136–144)
WBC NRBC COR # BLD: 6.9 10*3/MM3 (ref 3.4–10.8)

## 2019-07-11 PROCEDURE — 83036 HEMOGLOBIN GLYCOSYLATED A1C: CPT | Performed by: NURSE PRACTITIONER

## 2019-07-11 PROCEDURE — 97116 GAIT TRAINING THERAPY: CPT

## 2019-07-11 PROCEDURE — 85025 COMPLETE CBC W/AUTO DIFF WBC: CPT | Performed by: NURSE PRACTITIONER

## 2019-07-11 PROCEDURE — 25010000002 HEPARIN (PORCINE) PER 1000 UNITS: Performed by: NURSE PRACTITIONER

## 2019-07-11 PROCEDURE — G0378 HOSPITAL OBSERVATION PER HR: HCPCS

## 2019-07-11 PROCEDURE — 99217 PR OBSERVATION CARE DISCHARGE MANAGEMENT: CPT | Performed by: HOSPITALIST

## 2019-07-11 PROCEDURE — 96361 HYDRATE IV INFUSION ADD-ON: CPT

## 2019-07-11 PROCEDURE — 96372 THER/PROPH/DIAG INJ SC/IM: CPT

## 2019-07-11 PROCEDURE — 80048 BASIC METABOLIC PNL TOTAL CA: CPT | Performed by: NURSE PRACTITIONER

## 2019-07-11 RX ORDER — MECLIZINE HYDROCHLORIDE 25 MG/1
25 TABLET ORAL 3 TIMES DAILY PRN
Qty: 30 TABLET | Refills: 0 | Status: SHIPPED | OUTPATIENT
Start: 2019-07-11 | End: 2020-07-08

## 2019-07-11 RX ADMIN — Medication 250 MG: at 08:59

## 2019-07-11 RX ADMIN — SODIUM CHLORIDE 100 ML/HR: 900 INJECTION, SOLUTION INTRAVENOUS at 01:38

## 2019-07-11 RX ADMIN — ASPIRIN 81 MG 81 MG: 81 TABLET ORAL at 08:56

## 2019-07-11 RX ADMIN — ATENOLOL 25 MG: 25 TABLET ORAL at 08:56

## 2019-07-11 RX ADMIN — HEPARIN SODIUM 5000 UNITS: 5000 INJECTION INTRAVENOUS; SUBCUTANEOUS at 08:57

## 2019-07-11 RX ADMIN — LEVOTHYROXINE SODIUM 50 MCG: 50 TABLET ORAL at 06:42

## 2019-07-11 RX ADMIN — Medication 3 ML: at 08:59

## 2019-07-11 NOTE — DISCHARGE SUMMARY
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    Hypothyroidism    GERD without esophagitis    CKD (chronic kidney disease) stage 3, GFR 30-59 ml/min (CMS/HCC)        Unsteady gait with dizziness likely secondary to BPPV, improved    -CT head:  No acute findings  -MRI brain negative for acute intracranial abnormality  -carotid doppler report done at [facility] 1/13/2018 reviewed; negative bilaterally  -orthostatic vital signs were normal (lying 148/79, sitting 159/84, standing 156/74)  -meclizine TID PRN  -PT eval and treat  recommended outpatient PT     Dyspnea on exertion, resolved; likely secondary to above  -Acute coronary syndrome ruled out with native serial troponins and EKG   -6/2018 stress Myoview negative  -No further cardiac work-up indicated at this time     Acute hyperglycemia in a non-fasting glucose without h/o DM  -A1c 5.4  -Blood sugar subsequently normal     Coronary artery disease / Hyperlipidemia / Essential Hypertension, chronic, controlled  -continue home aspirin, atenolol, and statin     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  -on tacrolimus oral rinse     Hypothyroidism  -continue home levothyroxine     GERD without esophagitis  -on ranitidine      CKD stage 3  -baseline Cr 1.3-1.6 and current creatinine 1.4             Presenting Problem/History of Present Illness  Active Hospital Problems    Diagnosis  POA   • Unsteady gait [R26.81]  Yes     Priority: High   • Hypothyroidism [E03.9]  Yes   • CKD (chronic kidney disease) stage 3, GFR 30-59 ml/min (CMS/HCC) [N18.3]  Yes   • Acute hyperglycemia [R73.9]  Yes   • Hypertension [I10]  Yes   • Coronary artery disease [I25.10]  Yes   • Hyperlipidemia [E78.5]  Yes       Resolved Hospital Problems    Diagnosis Date Resolved POA   • **Dizzy [R42] 07/11/2019 Yes     Priority: High   • Weakness of both lower extremities [R29.898] 07/11/2019 Yes   • Dyspnea on exertion [R06.09] 07/11/2019 Yes        From the HPI: This is an 83-year-old  female with a history of CAD, HTN, HLD, hypothyroidism, and CKD who presented to the ED on 07/10/2019 with complaint of unsteady gait, dizziness, and leg weakness. The patient states she felt fine when she went to bed last night. She states this morning when she woke up and got up to go to the bathroom she was off balance. She denies falling. She states she lied back down because the room started to spin and she felt dizzy. She states after several minutes the dizziness past. She states when she tried to get back up her legs were very weak. She states her left leg felt weaker than her right leg. She denies any slurred speech, facial droop, headache, or visual disturbances. She denies fever, chills, chest pain, abdominal pain, nausea, vomiting, diarrhea, or urinary complaints. She reports shortness of breath with exertion, but states this has been intermittent for the past month. She states she had a normal stress test done last year. She also reports she had an ultrasound of her carotid arteries done at [facility] last year. She is unsure of the results. She denies a history of vertigo, orthostatic hypotension, or stroke.      Review of records:  6/27/18 stress Myoview negative for ischemia, EF 71%  2018 Carotid Duplex done at [facility]---report requested     Upon arrival to the ER the patient was given Antivert 50 mg PO. Her troponin was negative, EKG showed sinus rhythm, CT head showed no acute abnormalities, and CXR showed no acute findings. Her glucose was elevated at 161. Her BUN/Cr appear at baseline. She was admitted for further evaluation.      Hospital course: Patient had resolution of her dizziness after receiving  meclizine in the emergency department.  She reported initially still feeling generalized weakness in both lower extremities but this gradually resolved to the hospital stay.  Physical therapy saw the patient and recommended outpatient evaluation.  The patient is feeling much better and is felt to be stable for discharge at this time.    Past Medical History:     Past Medical History:   Diagnosis Date   • CKD (chronic kidney disease) stage 3, GFR 30-59 ml/min (CMS/[facility])    • Coronary artery disease    • GERD (gastroesophageal reflux disease)    • Hyperlipidemia    • Hypertension    • Hypothyroidism    • Rotator cuff tear, left        Past Surgical History:     Past Surgical History:   Procedure Laterality Date   • BREAST MASS EXCISION Right     Breast tumor R   • CARDIAC CATHETERIZATION     • CHOLECYSTECTOMY     • ENDOSCOPY     • HEMORRHOIDECTOMY     • PARTIAL HYSTERECTOMY     • SHOULDER ARTHROSCOPY W/ ROTATOR CUFF REPAIR Left    • TONSILLECTOMY         Social History:   Social History     Socioeconomic History   • Marital status:      Spouse name: Not on file   • Number of children: Not on file   • Years of education: Not on file   • Highest education level: Not on file   Tobacco Use   • Smoking status: Never Smoker   • Smokeless tobacco: Never Used   Substance and Sexual Activity   • Alcohol use: No     Frequency: Never   • Drug use: No   • Sexual activity: Defer       Procedures Performed         Consults:   Consults     No orders found for last 30 day(s).          Pertinent Test Results:     Lab Results (last 72 hours)     Procedure Component Value Units Date/Time    Basic Metabolic Panel [[id number]]  (Abnormal) Collected:  07/11/19 0200    Specimen:  Blood Updated:  07/11/19 0244     Glucose 108 mg/dL      BUN 21 mg/dL      Creatinine 1.40 mg/dL      Sodium 139 mmol/L      Potassium 4.5 mmol/L      Chloride 109 mmol/L      CO2 23.0 mmol/L      Calcium 8.6 mg/dL      eGFR Non African Amer 36 mL/min/1.73       BUN/Creatinine Ratio 15.0     Anion Gap 11.5 mmol/L     Narrative:       The MDRD GFR formula is only valid for adults with stable renal function between ages 18 and 70.    CBC Auto Differential [[id number]]  (Abnormal) Collected:  07/11/19 0200    Specimen:  Blood Updated:  07/11/19 0227     WBC 6.90 10*3/mm3      RBC 4.14 10*6/mm3      Hemoglobin 12.9 g/dL      Hematocrit 38.8 %      MCV 93.8 fL      MCH 31.2 pg      MCHC 33.3 g/dL      RDW 14.2 %      RDW-SD 47.7 fl      MPV 9.5 fL      Platelets 170 10*3/mm3      Neutrophil % 57.9 %      Lymphocyte % 27.6 %      Monocyte % 9.0 %      Eosinophil % 3.8 %      Basophil % 1.7 %      Neutrophils, Absolute 4.00 10*3/mm3      Lymphocytes, Absolute 1.90 10*3/mm3      Monocytes, Absolute 0.60 10*3/mm3      Eosinophils, Absolute 0.30 10*3/mm3      Basophils, Absolute 0.10 10*3/mm3      nRBC 0.2 /100 WBC     Hemoglobin A1c [[id number]] Collected:  07/11/19 0200    Specimen:  Blood Updated:  07/11/19 0221    Troponin [[id number]]  (Normal) Collected:  07/10/19 2311    Specimen:  Blood Updated:  07/10/19 2353     Troponin I <0.030 ng/mL     Narrative:       Troponin I Reference Range:    0.00-0.03  Negative.  Repeat testing in 4-6 hours if clinically indicated.    0.04-0.29  Suspicious for myocardial injury. Serial measurements and clinical  correlation may be necessary to confirm or exclude diagnosis of acute  coronary syndrome.  Repeat testing in 4-6 hours if indicated.     >0.29 Consistent with myocardial injury.  Recommend clinical and laboratory correlation.     Results my be falsely decreased if patient taking Biotin.     Troponin [[id number]]  (Normal) Collected:  07/10/19 1824    Specimen:  Blood Updated:  07/10/19 1945     Troponin I <0.030 ng/mL     Narrative:       Troponin I Reference Range:    0.00-0.03  Negative.  Repeat testing in 4-6 hours if clinically indicated.    0.04-0.29  Suspicious for myocardial injury. Serial measurements and clinical  correlation may  be necessary to confirm or exclude diagnosis of acute  coronary syndrome.  Repeat testing in 4-6 hours if indicated.     >0.29 Consistent with myocardial injury.  Recommend clinical and laboratory correlation.     Results my be falsely decreased if patient taking Biotin.     Lipid Panel [[id number]]  (Abnormal) Collected:  07/10/19 1157    Specimen:  Blood Updated:  07/10/19 1532     Total Cholesterol 185 mg/dL      Triglycerides 97 mg/dL      HDL Cholesterol 58 mg/dL      LDL Cholesterol  113 mg/dL      VLDL Cholesterol 19.4 mg/dL      LDL/HDL Ratio 1.86     Chol/HDL Ratio 3.19    Narrative:       The following guidelines have been recommended by the NCEP for Total Cholesterol, Total Triglycerides, LDL Cholesterol, and HDL Cholesterols    Total Cholesterol  Desirable:        <200 mg/dL  Borderline High:  200-239 mg/dL  High:             > or = 240 mg/dL    Total Triglyceride  Normal:           <150 mg/dL  Borderline High:  150-199 mg/dL  High:             200-499 mg/dL  Very High:        > or = 500 mg/dL    HDL Cholesterol  Low HDL:          <40 mg/dL  Normal:           40-60 mg/dL  Desirable:        >60 mg/dL    LDL Cholesterol  Optimal:          <100 mg/dL  Low Risk:         100-129 mg/dL  Borderline High:  130-159 mg/dL  High:             160-189 mg/dL  Very High:        > or = 190 mg/dL    The following ratios of LDL to HDL and Total cholesterol to HDL are for information only:    LDL/HDL Ratio  Desirable:        <5  Optimal:          < or = 3.5    Total Cholesterol/HDL Ratio  Low Risk:         3.3-4.4  Average Risk:     4.4-7.1  Medium Risk:      7.1-11  High Risk:        >11       Troponin [[id number]]  (Normal) Collected:  07/10/19 1157    Specimen:  Blood Updated:  07/10/19 1253     Troponin I <0.030 ng/mL     Narrative:       Troponin I Reference Range:    0.00-0.03  Negative.  Repeat testing in 4-6 hours if clinically indicated.    0.04-0.29  Suspicious for myocardial injury. Serial measurements and  clinical  correlation may be necessary to confirm or exclude diagnosis of acute  coronary syndrome.  Repeat testing in 4-6 hours if indicated.     >0.29 Consistent with myocardial injury.  Recommend clinical and laboratory correlation.     Results my be falsely decreased if patient taking Biotin.     Comprehensive Metabolic Panel [[id number]]  (Abnormal) Collected:  07/10/19 0845    Specimen:  Blood Updated:  07/10/19 0916     Glucose 161 mg/dL      BUN 24 mg/dL      Creatinine 1.50 mg/dL      Sodium 136 mmol/L      Potassium 4.1 mmol/L      Chloride 103 mmol/L      CO2 23.0 mmol/L      Calcium 9.0 mg/dL      Total Protein 6.7 g/dL      Albumin 3.70 g/dL      ALT (SGPT) 17 U/L      AST (SGOT) 21 U/L      Alkaline Phosphatase 66 U/L      Total Bilirubin 0.6 mg/dL      eGFR Non African Amer 33 mL/min/1.73      Globulin 3.0 gm/dL      A/G Ratio 1.2 g/dL      BUN/Creatinine Ratio 16.0     Anion Gap 14.1 mmol/L     Narrative:       The MDRD GFR formula is only valid for adults with stable renal function between ages 18 and 70.    Lipase [[id number]]  (Abnormal) Collected:  07/10/19 0845    Specimen:  Blood Updated:  07/10/19 0916     Lipase 54 U/L     CBC & Differential [[id number]] Collected:  07/10/19 0845    Specimen:  Blood Updated:  07/10/19 0853    Narrative:       The following orders were created for panel order CBC & Differential.  Procedure                               Abnormality         Status                     ---------                               -----------         ------                     CBC Auto Differential[[id number]]        Abnormal            Final result                 Please view results for these tests on the individual orders.    CBC Auto Differential [[id number]]  (Abnormal) Collected:  07/10/19 0845    Specimen:  Blood Updated:  07/10/19 0853     WBC 5.00 10*3/mm3      RBC 4.34 10*6/mm3      Hemoglobin 13.5 g/dL      Hematocrit 40.5 %      MCV 93.3 fL      MCH 31.1 pg      MCHC 33.3 g/dL       RDW 14.1 %      RDW-SD 46.4 fl      MPV 9.3 fL      Platelets 168 10*3/mm3      Neutrophil % 65.6 %      Lymphocyte % 19.3 %      Monocyte % 8.4 %      Eosinophil % 5.5 %      Basophil % 1.2 %      Neutrophils, Absolute 3.30 10*3/mm3      Lymphocytes, Absolute 1.00 10*3/mm3      Monocytes, Absolute 0.40 10*3/mm3      Eosinophils, Absolute 0.30 10*3/mm3      Basophils, Absolute 0.10 10*3/mm3      nRBC 0.1 /100 WBC              No results found for: BLOODCX   No results found for: URINECX   No results found for: WOUNDCX   No results found for: RESPCX   No results found for: STOOLCX   No results found for: STOOLCXY   No results found for: MRSACX   No results found for: VRECX   No results found for: CRECX   No components found for: AFBSTAINCX   No results found for: AFBCX   No results found for: AFBCXBLD   No results found for: FUNGUSCX   No components found for: GMSSTAIN   No results found for: KOHPREP   No results found for: ANACX   No results found for: BODYFLDCX   No results found for: CSFCX   No results found for: CULTURE   No results found for: THROATCX   No results found for: THROATCXBS   No results found for: ICECX   No results found for: DICECX   No results found for: GCCX           Imaging Results (all)     Procedure Component Value Units Date/Time    MRI Brain Without Contrast [[id number]] Collected:  07/10/19 1717     Updated:  07/10/19 1730    Narrative:          DATE OF EXAM:   7/10/2019 4:53 PM     PROCEDURE:   MRI BRAIN WO CONTRAST-     INDICATIONS:   dizziness, unsteady gait, BLE weakess, r/o CVA; R42-Dizziness and  giddiness; R06.09-Other forms of dyspnea     COMPARISON:  No Comparisons Available     TECHNIQUE:   Routine magnetic resonance imaging of the brain was performed without  administration of contrast.     FINDINGS:   There is mild generalized cerebral atrophy. There is fairly extensive  increased T2 and FLAIR signal change throughout the bilateral  periventricular and subcortical white  matter consistent with chronic  microvascular ischemia. There is no evidence of pathologic extra-axial  fluid collection. There is no hemorrhage. The major intracranial flow  voids are preserved. The diffusion-weighted sequences are normal.        Impression:       Mild atrophy and marked chronic microvascular ischemia with no  convincing acute process.     Electronically Signed By-Unruly Mukherjee On:7/10/2019 5:19 PM  This report was finalized on [id number] by  Unruly Mukherjee, .    CT Head Without Contrast [[id number]] Collected:  07/10/19 0933     Updated:  07/10/19 0937    Narrative:       CT HEAD WO CONTRAST-     Date of Exam: 7/10/2019 9:24 AM     Indication: dizzy.     Comparison: None available.     Technique:  Without contrast, contiguous axial CT images of the head  were obtained from skull base to vertex.  Coronal and sagittal  reconstructions were performed.  Automated exposure control and  iterative reconstruction methods were used.     FINDINGS  No acute intracranial hemorrhage or mass/mass effect. The ventricles and  sulci are appropriate for age. The basilar cisterns are patent. Patchy  periventricular and subcortical white matter low-attenuation,  statistically representing age-indeterminate small vessel ischemic  changes. Gray-white matter differentiation is otherwise grossly  maintained. Intracranial vascular calcifications, consistent with  atherosclerotic disease. Evidence of bilateral cataract surgery. Limited  imaging of the previous sinuses and mastoid air cells is unremarkable.  No acute osseous abnormality is identified.       Impression:          1. No acute intracranial hemorrhage or mass effect mass effect.  2. Patchy periventricular and subcortical white matter low-attenuation,  statistically representing age-indeterminate small vessel ischemic  changes.     Electronically Signed By-Pop Heredia On:7/10/2019 9:35 AM  This report was finalized on [id number] by  Pop Heredia, .    XR Chest 1  View [[id number]] Collected:  07/10/19 0912     Updated:  07/10/19 0916    Narrative:       DATE OF EXAM:  7/10/2019 9:03 AM     PROCEDURE:  XR CHEST 1 VW-     INDICATIONS:  dizzy     COMPARISON:  12/20/2018     TECHNIQUE:   Single radiographic AP view of the chest was obtained.     FINDINGS:  Heart size and pulmonary vessels are within normal limits. Lungs are  clear bilaterally. There are no pleural effusions. Bony structures are  unremarkable.        Impression:       No acute cardiomegaly disease.     Electronically Signed By-Franklin Anne On:7/10/2019 9:14 AM  This report was finalized on [id number] by  Franklin Anne, .            Condition on Discharge: Stable    Vital Signs  Temp:  [97.6 °F (36.4 °C)-98.7 °F (37.1 °C)] 98.7 °F (37.1 °C)  Heart Rate:  [68-84] 68  Resp:  [14-18] 16  BP: (146-171)/(60-94) 171/94    Physical Exam:  Well-developed over nourished female walking around in the room awake and alert comfortable on room air in no acute distress; mucous membranes moist; lungs clear to auscultation bilaterally; CV regular rate and rhythm; abdomen soft and nontender; extremities with no edema; no Napier catheter.      Discharge Disposition  Home or Self Care    Discharge Medications     Discharge Medications      New Medications      Instructions Start Date   meclizine 25 MG tablet  Commonly known as:  ANTIVERT   25 mg, Oral, 3 Times Daily PRN         Continue These Medications      Instructions Start Date   aspirin 81 MG chewable tablet   81 mg, Oral, Daily      atenolol 25 MG tablet  Commonly known as:  TENORMIN   25 mg, Oral, 2 Times Daily      CALTRATE 600+D 600-400 MG-UNIT per tablet  Generic drug:  calcium carbonate-vitamin d   1 tablet, Oral, Daily      conjugated estrogens 0.625 MG/GM vaginal cream  Commonly known as:  PREMARIN   1 g, Vaginal, 3 Times Weekly, Mon,Wed,Fri       CoQ-10 200 MG capsule   1 capsule, Oral, Daily      levothyroxine 50 MCG tablet  Commonly known as:  SYNTHROID,  LEVOTHROID   LEVOTHYROXINE SODIUM 50 MCG TABS      multivitamin with minerals tablet tablet   1 tablet, Oral, Daily      PROGRAF PO   10 mL, Oral, 4 Times Daily, Tacrolimus Liquid 1mg/1,000mL  Swish and Spit 10mL QID (pt usually uses BID-TID) Verified per [facility] pharmacy where med is compounded. Instructed family to bring med.       raNITIdine 150 MG tablet  Commonly known as:  ZANTAC   150 mg, Oral, Daily      saccharomyces boulardii 250 MG capsule  Commonly known as:  FLORASTOR   250 mg, Oral, Daily      simvastatin 20 MG tablet  Commonly known as:  ZOCOR   20 mg, Oral, Nightly             Discharge Diet:   Diet Instructions     Diet: Cardiac      Discharge Diet:  Cardiac          Activity at Discharge:   Activity Instructions     Activity as Tolerated            Follow-up Appointments  Future Appointments   Date Time Provider Department Center   7/15/2019  2:30 PM Martinez Whittaker MD [facility]     Additional Instructions for the Follow-ups that You Need to Schedule     Call MD With Problems / Concerns   As directed      Instructions: Call [phone] or email [email or web address] for problems or concerns.    Order Comments:  Instructions: Call [phone] or email [email or web address] for problems or concerns.          Discharge Follow-up with PCP   As directed       Currently Documented PCP:    Gokul Piña MD    PCP Phone Number:    [phone]     Follow Up Details:  1 week               Test Results Pending at Discharge       Risk for Readmission (LACE) Score: 3 (7/11/2019  6:00 AM)          Rosy Chester MD  07/11/19  11:16 AM    Time: Discharge time 30 minutes

## 2019-07-11 NOTE — DISCHARGE PLACEMENT REQUEST
"Lucy Macias (83 y.o. Female)     Date of Birth Social Security Number Address Home Phone MRN    1935  [id number] [address] [phone] [id number]    Gnosticism Marital Status          None        Admission Date Admission Type Admitting Provider Attending Provider Department, Room/Bed    7/10/19 Emergency Rosy Chester MD Hall, Kelli G, MD [facility] OBSERVATION, 106/1    Discharge Date Discharge Disposition Discharge Destination         Home or Self Care              Attending Provider:  Rosy Chester MD    Allergies:  Clindamycin Hcl, Tetracycline    Isolation:  None   Infection:  None   Code Status:  CPR    Ht:  165.1 cm (65\")   Wt:  77.6 kg (171 lb 1.2 oz)    Admission Cmt:  None   Principal Problem:  Dizzy [R42]                 Active Insurance as of 7/10/2019     Primary Coverage     Payor Plan Insurance Group Employer/Plan Group    MEDICARE MEDICARE A & B      Payor Plan Address Payor Plan Phone Number Payor Plan Fax Number Effective Dates    [address] [phone]  11/1/2001 - None Entered    [facility] [address]       Subscriber Name Subscriber Birth Date Member ID       LUCY MACIAS 1935 [id number]           Secondary Coverage     Payor Plan Insurance Group Employer/Plan Group    AARP MED SUPP AARP HEALTH CARE OPTIONS      Payor Plan Address Payor Plan Phone Number Payor Plan Fax Number Effective Dates    [facility] [phone]  1/1/2019 - None Entered    [address]       [facility] [address]       Subscriber Name Subscriber Birth Date Member ID       LUCY MACIAS 1935 [id number]                 Emergency Contacts      (Rel.) Home Phone Work Phone Mobile Phone    [phone] -- --            Emergency Contact Information     Name Relation Home Work Mobile    CONTACT,NO  [phone]            Insurance Information                MEDICARE/MEDICARE A & B Phone: [phone]    " Subscriber: Lucy Macias Subscriber#: [id number]    Group#:  Precert#:         [facility] MED SUPP/[facility] HEALTH CARE OPTIONS Phone: [phone]    Subscriber: Lucy Macias Subscriber#: [id number]    Group#:  Precert#:           Physician Progress Notes (last 24 hours) (Notes from 7/10/2019 11:39 AM through 2019 11:39 AM)     No notes of this type exist for this encounter.           Physical Therapy Notes (last 48 hours) (Notes from 2019 11:39 AM through 2019 11:39 AM)      Francine Milan PT at 7/10/2019  5:09 PM  Version 1 of 1         Problem: Patient Care Overview  Goal: Plan of Care Review  Outcome: Ongoing (interventions implemented as appropriate)   07/10/19 1703   OTHER   Outcome Summary 84 y/o F who presented to ED this AM with report of imbalance and room spinning dizziness when she woke up this morning. Room spinning dizziness resolved within 20 min per report and has not recurred. Imbalance persists. Patient denies: ear fullness, ear pain, hearing loss, numbness, tingling, recent falls, history of imbalance, history of dizziness, upper respiratory infection. Denies lightheadedness. She lives alone, is indep. with ADLs and drives at baseline. Vision screen is (-). Based on balance assessment, patient relies heavily on vision for balance and also demonstrates postural instability with narrowing STEVEN. Patient with postural instability with dynamic gait. Not able to complete BPPV positional assessment due to pt needing to go to MRI. Will complete tomorrow AM. Recommding OPPT upon d/c due to decreased use of vestibular inputs placing her at risk for falls.            Electronically signed by Francine Milan PT at 7/10/2019  5:09 PM     Francine Milan PT at 7/10/2019  5:12 PM  Version 1 of 1         Acute Care - Physical Therapy Initial Evaluation   Marco     Patient Name: Lucy Macias  : 1935  MRN: [id number]  Today's Date: 7/10/2019                 Admit Date: 7/10/2019    Visit Dx:     ICD-10-CM ICD-9-CM   1. Dizzy R42 780.4   2. 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   • Hypothyroidism   • GERD without esophagitis   • CKD (chronic kidney disease) stage 3, GFR 30-59 ml/min (CMS/HCC)   • Acute hyperglycemia     Past Medical History:   Diagnosis Date   • CKD (chronic kidney disease) stage 3, GFR 30-59 ml/min (CMS/HCC)    • Coronary artery disease    • GERD (gastroesophageal reflux disease)    • Hyperlipidemia    • Hypertension    • Hypothyroidism    • Rotator cuff tear, left      Past Surgical History:   Procedure Laterality Date   • BREAST MASS EXCISION Right     Breast tumor R   • CARDIAC CATHETERIZATION     • CHOLECYSTECTOMY     • ENDOSCOPY     • HEMORRHOIDECTOMY     • PARTIAL HYSTERECTOMY     • SHOULDER ARTHROSCOPY W/ ROTATOR CUFF REPAIR Left    • TONSILLECTOMY          PT ASSESSMENT (last 12 hours)      Physical Therapy Evaluation     Row Name 07/10/19 1546          PT Evaluation Time/Intention    Comment  Patient reports that this morning when she woke up, she walked to the bathroom and she was off balance. When she laid back down, the room was spinning. This lasted 20 min and she has not experienced the spinning sensation again. Denies having any type of dizziness prior. Denies any recent falls. Denies any upper respiratory issues. Denies numbness/tingling. Denies ringing in ears, fullness, ear pain, or hearing loss.   -SS     Row Name 07/10/19 1546          General Information    Patient/Family Observations  supine in bed, son and grandson present  -SS     Prior Level of Function  independent:;driving;home management;ADL's;gait;community mobility plays cards multiple times a week  -SS     Pertinent History of  Current Functional Problem  82 y/o F who presented to ED following episode of imbalance and room spinning dizziness this AM.   -[facility] Name 07/10/19 1546          Relationship/Environment    Lives With  alone  -SS     Row Name 07/10/19 1546          Resource/Environmental Concerns    Current Living Arrangements  home/apartment/condo 1 story with basement, no INDIO  -[facility] Name 07/10/19 1546          Cognitive Assessment/Intervention- PT/OT    Orientation Status (Cognition)  oriented x 4  -SS     Row Name 07/10/19 1546          Bed Mobility Assessment/Treatment    Comment (Bed Mobility)  performs supine to sit with SBA  -[facility] Name 07/10/19 1546          Transfer Assessment/Treatment    Comment (Transfers)  Transfers with CGA  -SS     Row Name 07/10/19 1546          Gait/Stairs Assessment/Training    [place] Level (Gait)  contact guard  -     Distance in Feet (Gait)  200  -     Comment (Gait/Stairs)  mild postural instability with typical gait; moderate postural instability with turns and head turns, significant postural instabilty with head nods. Decreased gait speed as a compensation. Significant difficulty with backward gait. Veering to R with eyes closed gait.   -     Row Name 07/10/19 1546          General ROM    GENERAL ROM COMMENTS  grossly WFL  -SS     Row Name 07/10/19 1546          MMT (Manual Muscle Testing)    General MMT Comments  UEs 4/5; LEs 4/5 per seated strength assessment  -SS     Row Name 07/10/19 1546          Motor Assessment/Intervention    Additional Documentation  Balance (Group);Fine Motor Testing & Training (Group);Gross Motor Coordination (Group)  -SS     Row Name 07/10/19 1546          Gross Motor Coordination    Gross Motor Skill, Impairments Detail  normal LE rapid alternating movements, increased time required for finger to nose test   -SS     Row Name 07/10/19 1546          Balance    Balance  dynamic standing balance;static standing balance  -[facility] Name  07/10/19 1546          Static Standing Balance    Comment (Unsupported Standing, Static Balance)  typical STEVEN eyes open: normal; typical STEVEN eyes closed: normal; narrow STEVEN eyes open: increased posural sway; narrow STEVEN eyes closed: loss of balance in 5 sec, able to recover with min A  -SS     Row Name 07/10/19 1546          Dynamic Standing Balance    Comment, Resists Mild Perturbations (Sitting, Dynamic Balance)  tandem stance immediate LOB bilaterally, increased difficulty with staggered stance with LOB in 10 sec  -SS     Row Name 07/10/19 1546          Sensory Assessment/Intervention    Sensory General Assessment  -- grossly intact and equal to light touch  -SS     Row Name 07/10/19 1546          Vision Assessment/Intervention    Vision Assessment Comment  smooth pursuit WNL; saccades- difficulty with attention; VOR normal; head trust test normal; head shaking nystagmus test normal  -SS     Row Name 07/10/19 1546          Pain Assessment    Additional Documentation  -- denies pain   -SS     Row Name 07/10/19 1546          Plan of Care Review    Plan of Care Reviewed With  patient;family  -SS     Row Name 07/10/19 1546          Physical Therapy Clinical Impression    Criteria for Skilled Interventions Met (PT Clinical Impression)  yes;treatment indicated  -     Impairments Found (describe specific impairments)  gait, locomotion, and balance  -     Rehab Potential (PT Clinical Summary)  good, to achieve stated therapy goals  -SS     Row Name 07/10/19 1546          Physical Therapy Goals    Transfer Goal Selection (PT)  transfer, PT goal 1  -     Gait Training Goal Selection (PT)  gait training, PT goal 1  -SS     Row Name 07/10/19 1540          Transfer Goal 1 (PT)    Activity/Assistive Device (Transfer Goal 1, PT)  transfers, all  -     [place] Level/Cues Needed (Transfer Goal 1, PT)  independent  -     Time Frame (Transfer Goal 1, PT)  long term goal (LTG);2 weeks  -SS     Row Name 07/10/19 1541           Gait Training Goal 1 (PT)    Activity/Assistive Device (Gait Training Goal 1, PT)  gait (walking locomotion)  -SS     [place] Level (Gait Training Goal 1, PT)  independent  -SS     Distance (Gait Goal 1, PT)  300  -SS     Time Frame (Gait Training Goal 1, PT)  long term goal (LTG);2 weeks  -SS       User Key  (r) = Recorded By, (t) = Taken By, (c) = Cosigned By    Initials Name Provider Type    SS Francine Milan, PT Physical Therapist        Physical Therapy Education     Title: PT OT SLP Therapies (Done)     Topic: Physical Therapy (Done)     Point: Mobility training (Done)     Learning Progress Summary           Patient Acceptance, E, VU by  at 7/10/2019  5:10 PM                               User Key     Initials Effective Dates Name Provider Type Discipline     06/19/19 -  Francine Milan PT Physical Therapist PT              PT Recommendation and Plan  Anticipated Discharge Disposition (PT): home with OP services  Planned Therapy Interventions (PT Eval): balance training, gait training, home exercise program, neuromuscular re-education, patient/family education, vestibular therapy, transfer training, strengthening  Therapy Frequency (PT Clinical Impression): daily  Outcome Summary/Treatment Plan (PT)  Anticipated Discharge Disposition (PT): home with OP services  Plan of Care Reviewed With: patient, family  Outcome Summary: 82 y/o F who presented to ED this AM with report of imbalance and room spinning dizziness when she woke up this morning. Room spinning dizziness resolved within 20 min per report and has not recurred. Imbalance persists. Patient denies: ear fullness, ear pain, hearing loss, numbness, tingling, recent falls, history of imbalance, history of dizziness, upper respiratory infection. Denies lightheadedness. She lives alone, is indep. with ADLs and drives at baseline. Vision screen is (-). Based on balance assessment, patient relies heavily on vision for balance and also  "demonstrates postural instability with narrowing STEVEN. Patient with postural instability with dynamic gait. Not able to complete BPPV positional assessment due to pt needing to go to MRI. Will complete tomorrow AM. Recommding OPPT upon d/c due to decreased use of vestibular inputs placing her at risk for falls.      Time Calculation:   PT Charges     Row Name 07/10/19 1710             Time Calculation    Start Time  1540  -SS      Stop Time  1625  -SS      Time Calculation (min)  45 min  -SS      PT Received On  07/10/19  -      PT - Next Appointment  07/11/19  -      PT Goal Re-Cert Due Date  07/24/19  -SS        User Key  (r) = Recorded By, (t) = Taken By, (c) = Cosigned By    Initials Name Provider Type     Francine Milan, PT Physical Therapist        Therapy Charges for Today     Code Description Service Date Service Provider Modifiers Qty    32539255342 HC PT EVAL LOW COMPLEXITY 4 7/10/2019 Francine Milan, PT GP 1                 Francine Milan PT  7/10/2019         Electronically signed by Francine Milan PT at 7/10/2019  5:12 PM     Celina Leary PT at 7/11/2019  9:08 AM  Version 1 of 1         Problem: Patient Care Overview  Goal: Plan of Care Review  Outcome: Ongoing (interventions implemented as appropriate)   07/11/19 0904   Coping/Psychosocial   Plan of Care Reviewed With patient;son   Plan of Care Review   Progress improving   OTHER   Outcome Summary Pt appears to be functioning close to baseline with resolution (and no return) of symptoms of dizziness/room spinning. Pt does report occasional \"lightheadness\" upon first standing but with vitals WNL and reports significant improvement from yesterday. Deann-Hallpike manuver not assessed this date, as pt with no signs/symptoms of BPPV this session. Anticipate safe d/c home and plan for OP PT to assess/treat vestibular/balance deficits if signs/symptoms persist/return. Updated POC to 3x/week at [facility] and if signs/symptoms return, " "assess for BPPV.           Electronically signed by Celina Leary, PT at 2019  9:08 AM     Celina Leary, PT at 2019  9:08 AM  Version 1 of 1         Acute Care - Physical Therapy Treatment Note  KINZA Marco     Patient Name: Lucy Macias  : 1935  MRN: [id number]  Today's Date: 2019             Admit Date: 7/10/2019    Visit Dx:    ICD-10-CM ICD-9-CM   1. Dizzy R42 780.4   2. 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   • Hypothyroidism   • GERD without esophagitis   • CKD (chronic kidney disease) stage 3, GFR 30-59 ml/min (CMS/HCC)   • Acute hyperglycemia       Therapy Treatment    Rehabilitation Treatment Summary     Row Name 1935             Treatment Time/Intention    Discipline  physical therapist  -AO      Document Type  therapy note (daily note)  -AO      Subjective Information  -- occasional lightheadness  -AO      Mode of Treatment  physical therapy  -AO      Patient/Family Observations  Pt supine in bed with son present; IV, HM  -AO      Care Plan Review  patient/other agree to care plan  -AO      Patient Effort  excellent  -AO      Comment  Pt reports the symptoms she was experiencing yesterday with dizziness/room spinning have resolved and that she is no longer SOA. Reports she feels \"a little lightheaded\" when first standing but overall feels very close to baseline.  -AO      Existing Precautions/Restrictions  no known precautions/restrictions  -AO      Recorded by [AO] Celina Leary PT 19      Row Name 1935             Vital Signs    Recovery Time  Vitals WNL throughout session and pt reports no shortness of air during gait training (improvement from yesterday's session)  -AO      Recorded by [AO] [place], " Celina NOEL, PT 07/11/19 0902      Row Name 07/11/19 0836             Cognitive Assessment/Intervention- PT/OT    Orientation Status (Cognition)  oriented x 4  -AO      Recorded by [AO] Celina Leary, PT 07/11/19 0902      Row Name 07/11/19 0836             Bed Mobility Assessment/Treatment    Bed Mobility Assessment/Treatment  supine-sit  -AO      Supine-Sit [place] (Bed Mobility)  conditional independence  -AO      Assistive Device (Bed Mobility)  bed rails  -AO      Recorded by [AO] Celina Leary, PT 07/11/19 0902      Row Name 07/11/19 0836             Transfer Assessment/Treatment    Transfer Assessment/Treatment  sit-stand transfer;stand-sit transfer  -AO      Recorded by [AO] Celina Leary, PT 07/11/19 0902      Row Name 07/11/19 0836             Sit-Stand Transfer    Sit-Stand [place] (Transfers)  conditional independence  -AO      Recorded by [AO] Celina Leary, PT 07/11/19 0902      Row Name 07/11/19 0836             Stand-Sit Transfer    Stand-Sit [place] (Transfers)  conditional independence  -AO      Recorded by [AO] Celina Leary, PT 07/11/19 0902      Row Name 07/11/19 0836             Gait/Stairs Assessment/Training    Gait/Stairs Assessment/Training  distance ambulated  -AO      [place] Level (Gait)  conditional independence  -AO      Assistive Device (Gait)  other (see comments) pushes IV pole  -AO      Distance in Feet (Gait)  250 ft  -AO      Pattern (Gait)  other (see comments) Reciprocal gait pattern with no noteable deficits  -AO      Recorded by [AO] Celina Leary, PT 07/11/19 0902      Row Name 07/11/19 0836             Positioning and Restraints    Pre-Treatment Position  in bed  -AO      Post Treatment Position  chair  -AO      In Chair  notified nsg;sitting;call light within reach;encouraged to call for assist;exit alarm on;with family/caregiver;with nsg  -AO      Recorded by [AO] Celina Leary, PT 07/11/19 0902      Row Name 07/11/19 0836              Pain Assessment    Additional Documentation  Pain Scale: Numbers Pre/Post-Treatment (Group)  -AO      Recorded by [AO] Celina Leary, PT 07/11/19 0902      Row Name 07/11/19 0836             Pain Scale: Numbers Pre/Post-Treatment    Pain Scale: Numbers, Pretreatment  0/10 - no pain  -AO      Pain Scale: Numbers, Post-Treatment  0/10 - no pain  -AO      Recorded by [AO] Celina Leary, PT 07/11/19 0902      Row Name 07/11/19 0836             Plan of Care Review    Plan of Care Reviewed With  patient;son  -AO      Recorded by [AO] Celina Leary, PT 07/11/19 0902      Row Name 07/11/19 0836             Outcome Summary/Treatment Plan (PT)    Daily Summary of Progress (PT)  progress toward functional goals is good  -AO      Plan for Continued Treatment (PT)  Updated POC to 3x/week at [facility] to monitor/progress mobility/balance  -AO      Anticipated Discharge Disposition (PT)  home with OP services  -AO      Recorded by [AO] Celina Leary, PT 07/11/19 0902        User Key  (r) = Recorded By, (t) = Taken By, (c) = Cosigned By    Initials Name Effective Dates Discipline    AO Celina Leary, PT 03/01/19 -  PT                   Physical Therapy Education     Title: PT OT SLP Therapies (Done)     Topic: Physical Therapy (Done)     Point: Mobility training (Done)     Learning Progress Summary           Patient Acceptance, E,TB, VU by AO at 7/11/2019  9:02 AM    Comment:  Educated pt on POC and plan for outpatient phyiscal therapy services for vestibular/balance assessment/treatment as needed (if symptoms return or persist).    Acceptance, E, VU by  at 7/10/2019  5:10 PM                               User Key     Initials Effective Dates Name Provider Type Discipline     06/19/19 -  Francine Milan, PT Physical Therapist PT    AO 03/01/19 -  Celina Leary, PT Physical Therapist PT                PT Recommendation and Plan  Anticipated Discharge Disposition (PT): home with OP services  Outcome  "Summary/Treatment Plan (PT)  Daily Summary of Progress (PT): progress toward functional goals is good  Plan for Continued Treatment (PT): Updated POC to 3x/week at [facility] to monitor/progress mobility/balance  Anticipated Discharge Disposition (PT): home with OP services  Plan of Care Reviewed With: patient, son  Progress: improving  Outcome Summary: Pt appears to be functioning close to baseline with resolution (and no return) of symptoms of dizziness/room spinning. Pt does report occasional \"lightheadness\" upon first standing but with vitals WNL and reports significant improvement from yesterday. Wal[place]-Hallpike manuver not assessed this date, as pt with no signs/symptoms of BPPV this session. Anticipate safe d/c home and plan for OP PT to assess/treat vestibular/balance deficits if signs/symptoms persist/return. Updated POC to 3x/week at [facility] and if signs/symptoms return, assess for BPPV.     Time Calculation:   PT Charges     Row Name 07/11/19 0908             Time Calculation    Start Time  0836  -AO      Stop Time  0853  -AO      Time Calculation (min)  17 min  -AO      PT Received On  07/11/19  -AO      PT - Next Appointment  07/13/19  -AO        User Key  (r) = Recorded By, (t) = Taken By, (c) = Cosigned By    Initials Name Provider Type    AO Celina Leary, PT Physical Therapist        Therapy Charges for Today     Code Description Service Date Service Provider Modifiers Qty    80527855257 HC GAIT TRAINING EA 15 MIN 7/11/2019 Celina Leary, PT GP 1               Celina Leary PT  7/11/2019         Electronically signed by Celina Leary PT at 7/11/2019  9:09 AM          Discharge Summary      Rosy Chester MD at 7/11/2019  9:29 AM          Date of Admission: 7/10/2019    Date of Discharge:  7/11/2019    Length of stay:  LOS: 0 days     Discharge Diagnosis: Unsteady gait    Dizzy    Weakness of both lower extremities    Dyspnea on exertion    Acute hyperglycemia    Coronary artery disease    " Hyperlipidemia    Hypertension    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    Hypothyroidism    GERD without esophagitis    CKD (chronic kidney disease) stage 3, GFR 30-59 ml/min (CMS/HCC)        Unsteady gait with dizziness likely secondary to BPPV, improved    -CT head:  No acute findings  -MRI brain negative for acute intracranial abnormality  -carotid doppler report done at [facility] 1/13/2018 reviewed; negative bilaterally  -orthostatic vital signs were normal (lying 148/79, sitting 159/84, standing 156/74)  -meclizine TID PRN  -PT eval and treat   recommended outpatient PT     Dyspnea on exertion, resolved; likely secondary to above  -Acute coronary syndrome ruled out with native serial troponins and EKG   -6/2018 stress Myoview negative  -No further cardiac work-up indicated at this time     Acute hyperglycemia in a non-fasting glucose without h/o DM  -A1c 5.4  -Blood sugar subsequently normal     Coronary artery disease / Hyperlipidemia / Essential Hypertension, chronic, controlled  -continue home aspirin, atenolol, and statin     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  -on tacrolimus oral rinse     Hypothyroidism  -continue home levothyroxine     GERD without esophagitis  -on ranitidine      CKD stage 3  -baseline Cr 1.3-1.6 and current creatinine 1.4             Presenting Problem/History of Present Illness  Active Hospital Problems    Diagnosis  POA   • Unsteady gait [R26.81]  Yes     [facility]: [facility]   • Hypothyroidism [E03.9]  Yes   • CKD (chronic kidney disease) stage 3, GFR 30-59 ml/min (CMS/HCC) [N18.3]  Yes   • Acute hyperglycemia [R73.9]  Yes   • Hypertension [I10]  Yes   • Coronary artery disease [I25.10]  Yes   • Hyperlipidemia [E78.5]  Yes      Resolved Hospital Problems    Diagnosis Date Resolved POA   • **Dizzy [R42] 07/11/2019 Yes     [facility]: High   • Weakness of both lower extremities [R29.898] 07/11/2019 Yes   • Dyspnea on exertion [R06.09] 07/11/2019 Yes        From the HPI: This is  an 83-year-old  female with a history of CAD, HTN, HLD, hypothyroidism, and CKD who presented to the ED on 07/10/2019 with complaint of unsteady gait, dizziness, and leg weakness. The patient states she felt fine when she went to bed last night. She states this morning when she woke up and got up to go to the bathroom she was off balance. She denies falling. She states she lied back down because the room started to spin and she felt dizzy. She states after several minutes the dizziness past. She states when she tried to get back up her legs were very weak. She states her left leg felt weaker than her right leg. She denies any slurred speech, facial droop, headache, or visual disturbances. She denies fever, chills, chest pain, abdominal pain, nausea, vomiting, diarrhea, or urinary complaints. She reports shortness of breath with exertion, but states this has been intermittent for the past month. She states she had a normal stress test done last year. She also reports she had an ultrasound of her carotid arteries done at [facility] last year. She is unsure of the results. She denies a history of vertigo, orthostatic hypotension, or stroke.      Review of records:  6/27/18 stress Myoview negative for ischemia, EF 71%  2018 Carotid Duplex done at [facility]---report requested     Upon arrival to the ER the patient was given Antivert 50 mg PO. Her troponin was negative, EKG showed sinus rhythm, CT head showed no acute abnormalities, and CXR showed no acute findings. Her glucose was elevated at 161. Her BUN/Cr appear at baseline. She was admitted for further evaluation.      Hospital course: Patient had resolution of her dizziness after receiving meclizine in the emergency department.  She reported initially still feeling generalized weakness in both lower extremities but this gradually resolved to the hospital stay.  Physical therapy saw the patient and recommended outpatient evaluation.  The  patient is feeling much better and is felt to be stable for discharge at this time.    Past Medical History:     Past Medical History:   Diagnosis Date   • CKD (chronic kidney disease) stage 3, GFR 30-59 ml/min (CMS/[facility])    • Coronary artery disease    • GERD (gastroesophageal reflux disease)    • Hyperlipidemia    • Hypertension    • Hypothyroidism    • Rotator cuff tear, left        Past Surgical History:     Past Surgical History:   Procedure Laterality Date   • BREAST MASS EXCISION Right     Breast tumor R   • CARDIAC CATHETERIZATION     • CHOLECYSTECTOMY     • ENDOSCOPY     • HEMORRHOIDECTOMY     • PARTIAL HYSTERECTOMY     • SHOULDER ARTHROSCOPY W/ ROTATOR CUFF REPAIR Left    • TONSILLECTOMY         Social History:   Social History     Socioeconomic History   • Marital status:      Spouse name: Not on file   • Number of children: Not on file   • Years of education: Not on file   • Highest education level: Not on file   Tobacco Use   • Smoking status: Never Smoker   • Smokeless tobacco: Never Used   Substance and Sexual Activity   • Alcohol use: No     Frequency: Never   • Drug use: No   • Sexual activity: Defer       Procedures Performed         Consults:   Consults     No orders found for last 30 day(s).          Pertinent Test Results:     Lab Results (last 72 hours)     Procedure Component Value Units Date/Time    Basic Metabolic Panel [[id number]]  (Abnormal) Collected:  07/11/19 0200    Specimen:  Blood Updated:  07/11/19 0244     Glucose 108 mg/dL      BUN 21 mg/dL      Creatinine 1.40 mg/dL      Sodium 139 mmol/L      Potassium 4.5 mmol/L      Chloride 109 mmol/L      CO2 23.0 mmol/L      Calcium 8.6 mg/dL      eGFR Non African Amer 36 mL/min/1.73      BUN/Creatinine Ratio 15.0     Anion Gap 11.5 mmol/L     Narrative:       The MDRD GFR formula is only valid for adults with stable renal function between ages 18 and 70.    CBC Auto Differential [[id number]]  (Abnormal) Collected:  07/11/19 0200     Specimen:  Blood Updated:  07/11/19 0227     WBC 6.90 10*3/mm3      RBC 4.14 10*6/mm3      Hemoglobin 12.9 g/dL      Hematocrit 38.8 %      MCV 93.8 fL      MCH 31.2 pg      MCHC 33.3 g/dL      RDW 14.2 %      RDW-SD 47.7 fl      MPV 9.5 fL      Platelets 170 10*3/mm3      Neutrophil % 57.9 %      Lymphocyte % 27.6 %      Monocyte % 9.0 %      Eosinophil % 3.8 %      Basophil % 1.7 %      Neutrophils, Absolute 4.00 10*3/mm3      Lymphocytes, Absolute 1.90 10*3/mm3      Monocytes, Absolute 0.60 10*3/mm3      Eosinophils, Absolute 0.30 10*3/mm3      Basophils, Absolute 0.10 10*3/mm3      nRBC 0.2 /100 WBC     Hemoglobin A1c [[id number]] Collected:  07/11/19 0200    Specimen:  Blood Updated:  07/11/19 0221    Troponin [[id number]]  (Normal) Collected:  07/10/19 2311    Specimen:  Blood Updated:  07/10/19 2353     Troponin I <0.030 ng/mL     Narrative:       Troponin I Reference Range:    0.00-0.03  Negative.  Repeat testing in 4-6 hours if clinically indicated.    0.04-0.29  Suspicious for myocardial injury. Serial measurements and clinical  correlation may be necessary to confirm or exclude diagnosis of acute  coronary syndrome.  Repeat testing in 4-6 hours if indicated.     >0.29 Consistent with myocardial injury.  Recommend clinical and laboratory correlation.     Results my be falsely decreased if patient taking Biotin.     Troponin [[id number]]  (Normal) Collected:  07/10/19 1824    Specimen:  Blood Updated:  07/10/19 1945     Troponin I <0.030 ng/mL     Narrative:       Troponin I Reference Range:    0.00-0.03  Negative.  Repeat testing in 4-6 hours if clinically indicated.    0.04-0.29  Suspicious for myocardial injury. Serial measurements and clinical  correlation may be necessary to confirm or exclude diagnosis of acute  coronary syndrome.  Repeat testing in 4-6 hours if indicated.     >0.29 Consistent with myocardial injury.  Recommend clinical and laboratory correlation.     Results my be falsely decreased if  patient taking Biotin.     Lipid Panel [[id number]]  (Abnormal) Collected:  07/10/19 1157    Specimen:  Blood Updated:  07/10/19 1532     Total Cholesterol 185 mg/dL      Triglycerides 97 mg/dL      HDL Cholesterol 58 mg/dL      LDL Cholesterol  113 mg/dL      VLDL Cholesterol 19.4 mg/dL      LDL/HDL Ratio 1.86     Chol/HDL Ratio 3.19    Narrative:       The following guidelines have been recommended by the NCEP for Total Cholesterol, Total Triglycerides, LDL Cholesterol, and HDL Cholesterols    Total Cholesterol  Desirable:        <200 mg/dL  Borderline High:  200-239 mg/dL  High:             > or = 240 mg/dL    Total Triglyceride  Normal:           <150 mg/dL  Borderline High:  150-199 mg/dL  High:             200-499 mg/dL  Very High:        > or = 500 mg/dL    HDL Cholesterol  Low HDL:          <40 mg/dL  Normal:           40-60 mg/dL  Desirable:        >60 mg/dL    LDL Cholesterol  Optimal:          <100 mg/dL  Low Risk:         100-129 mg/dL  Borderline High:  130-159 mg/dL  High:             160-189 mg/dL  Very High:        > or = 190 mg/dL    The following ratios of LDL to HDL and Total cholesterol to HDL are for information only:    LDL/HDL Ratio  Desirable:        <5  Optimal:          < or = 3.5    Total Cholesterol/HDL Ratio  Low Risk:         3.3-4.4  Average Risk:     4.4-7.1  Medium Risk:      7.1-11  High Risk:        >11       Troponin [[id number]]  (Normal) Collected:  07/10/19 1157    Specimen:  Blood Updated:  07/10/19 1253     Troponin I <0.030 ng/mL     Narrative:       Troponin I Reference Range:    0.00-0.03  Negative.  Repeat testing in 4-6 hours if clinically indicated.    0.04-0.29  Suspicious for myocardial injury. Serial measurements and clinical  correlation may be necessary to confirm or exclude diagnosis of acute  coronary syndrome.  Repeat testing in 4-6 hours if indicated.     >0.29 Consistent with myocardial injury.  Recommend clinical and laboratory correlation.     Results my be  falsely decreased if patient taking Biotin.     Comprehensive Metabolic Panel [[id number]]  (Abnormal) Collected:  07/10/19 0845    Specimen:  Blood Updated:  07/10/19 0916     Glucose 161 mg/dL      BUN 24 mg/dL      Creatinine 1.50 mg/dL      Sodium 136 mmol/L      Potassium 4.1 mmol/L      Chloride 103 mmol/L      CO2 23.0 mmol/L      Calcium 9.0 mg/dL      Total Protein 6.7 g/dL      Albumin 3.70 g/dL      ALT (SGPT) 17 U/L      AST (SGOT) 21 U/L      Alkaline Phosphatase 66 U/L      Total Bilirubin 0.6 mg/dL      eGFR Non African Amer 33 mL/min/1.73      Globulin 3.0 gm/dL      A/G Ratio 1.2 g/dL      BUN/Creatinine Ratio 16.0     Anion Gap 14.1 mmol/L     Narrative:       The MDRD GFR formula is only valid for adults with stable renal function between ages 18 and 70.    Lipase [[id number]]  (Abnormal) Collected:  07/10/19 0845    Specimen:  Blood Updated:  07/10/19 0916     Lipase 54 U/L     CBC & Differential [[id number]] Collected:  07/10/19 0845    Specimen:  Blood Updated:  07/10/19 0853    Narrative:       The following orders were created for panel order CBC & Differential.  Procedure                               Abnormality         Status                     ---------                               -----------         ------                     CBC Auto Differential[[id number]]        Abnormal            Final result                 Please view results for these tests on the individual orders.    CBC Auto Differential [[id number]]  (Abnormal) Collected:  07/10/19 0845    Specimen:  Blood Updated:  07/10/19 0853     WBC 5.00 10*3/mm3      RBC 4.34 10*6/mm3      Hemoglobin 13.5 g/dL      Hematocrit 40.5 %      MCV 93.3 fL      MCH 31.1 pg      MCHC 33.3 g/dL      RDW 14.1 %      RDW-SD 46.4 fl      MPV 9.3 fL      Platelets 168 10*3/mm3      Neutrophil % 65.6 %      Lymphocyte % 19.3 %      Monocyte % 8.4 %      Eosinophil % 5.5 %      Basophil % 1.2 %      Neutrophils, Absolute 3.30 10*3/mm3      Lymphocytes,  Absolute 1.00 10*3/mm3      Monocytes, Absolute 0.40 10*3/mm3      Eosinophils, Absolute 0.30 10*3/mm3      Basophils, Absolute 0.10 10*3/mm3      nRBC 0.1 /100 WBC              No results found for: BLOODCX   No results found for: URINECX   No results found for: WOUNDCX   No results found for: RESPCX   No results found for: STOOLCX   No results found for: STOOLCXY   No results found for: MRSACX   No results found for: VRECX   No results found for: CRECX   No components found for: AFBSTAINCX   No results found for: AFBCX   No results found for: AFBCXBLD   No results found for: FUNGUSCX   No components found for: GMSSTAIN   No results found for: KOHPREP   No results found for: ANACX   No results found for: BODYFLDCX   No results found for: CSFCX   No results found for: CULTURE   No results found for: THROATCX   No results found for: THROATCXBS   No results found for: ICECX   No results found for: DICECX   No results found for: GCCX           Imaging Results (all)     Procedure Component Value Units Date/Time    MRI Brain Without Contrast [[id number]] Collected:  07/10/19 1717     Updated:  07/10/19 1730    Narrative:          DATE OF EXAM:   7/10/2019 4:53 PM     PROCEDURE:   MRI BRAIN WO CONTRAST-     INDICATIONS:   dizziness, unsteady gait, BLE weakess, r/o CVA; R42-Dizziness and  giddiness; R06.09-Other forms of dyspnea     COMPARISON:  No Comparisons Available     TECHNIQUE:   Routine magnetic resonance imaging of the brain was performed without  administration of contrast.     FINDINGS:   There is mild generalized cerebral atrophy. There is fairly extensive  increased T2 and FLAIR signal change throughout the bilateral  periventricular and subcortical white matter consistent with chronic  microvascular ischemia. There is no evidence of pathologic extra-axial  fluid collection. There is no hemorrhage. The major intracranial flow  voids are preserved. The diffusion-weighted sequences are normal.        Impression:        Mild atrophy and marked chronic microvascular ischemia with no  convincing acute process.     Electronically Signed By-Unruly Mukherjee On:7/10/2019 5:19 PM  This report was finalized on [id number] by  Unruly Mukherjee, .    CT Head Without Contrast [[id number]] Collected:  07/10/19 0933     Updated:  07/10/19 0937    Narrative:       CT HEAD WO CONTRAST-     Date of Exam: 7/10/2019 9:24 AM     Indication: dizzy.     Comparison: None available.     Technique:  Without contrast, contiguous axial CT images of the head  were obtained from skull base to vertex.  Coronal and sagittal  reconstructions were performed.  Automated exposure control and  iterative reconstruction methods were used.     FINDINGS  No acute intracranial hemorrhage or mass/mass effect. The ventricles and  sulci are appropriate for age. The basilar cisterns are patent. Patchy  periventricular and subcortical white matter low-attenuation,  statistically representing age-indeterminate small vessel ischemic  changes. Gray-white matter differentiation is otherwise grossly  maintained. Intracranial vascular calcifications, consistent with  atherosclerotic disease. Evidence of bilateral cataract surgery. Limited  imaging of the previous sinuses and mastoid air cells is unremarkable.  No acute osseous abnormality is identified.       Impression:          1. No acute intracranial hemorrhage or mass effect mass effect.  2. Patchy periventricular and subcortical white matter low-attenuation,  statistically representing age-indeterminate small vessel ischemic  changes.     Electronically Signed By-Pop Heredia On:7/10/2019 9:35 AM  This report was finalized on [id number] by  Pop Heredia, .    XR Chest 1 View [[id number]] Collected:  07/10/19 0912     Updated:  07/10/19 0916    Narrative:       DATE OF EXAM:  7/10/2019 9:03 AM     PROCEDURE:  XR CHEST 1 VW-     INDICATIONS:  dizzy     COMPARISON:  12/20/2018     TECHNIQUE:   Single radiographic AP view of the  chest was obtained.     FINDINGS:  Heart size and pulmonary vessels are within normal limits. Lungs are  clear bilaterally. There are no pleural effusions. Bony structures are  unremarkable.        Impression:       No acute cardiomegaly disease.     Electronically Signed By-Franklin Anne On:7/10/2019 9:14 AM  This report was finalized on [id number] by  Franklin Anne, .            Condition on Discharge: Stable    Vital Signs  Temp:  [97.6 °F (36.4 °C)-98.7 °F (37.1 °C)] 98.7 °F (37.1 °C)  Heart Rate:  [68-84] 68  Resp:  [14-18] 16  BP: (146-171)/(60-94) 171/94    Physical Exam:  Well-developed over nourished female walking around in the room awake and alert comfortable on room air in no acute distress; mucous membranes moist; lungs clear to auscultation bilaterally; CV regular rate and rhythm; abdomen soft and nontender; extremities with no edema; no Napier catheter.      Discharge Disposition  Home or Self Care    Discharge Medications     Discharge Medications      New Medications      Instructions Start Date   meclizine 25 MG tablet  Commonly known as:  ANTIVERT   25 mg, Oral, 3 Times Daily PRN         Continue These Medications      Instructions Start Date   aspirin 81 MG chewable tablet   81 mg, Oral, Daily      atenolol 25 MG tablet  Commonly known as:  TENORMIN   25 mg, Oral, 2 Times Daily      CALTRATE 600+D 600-400 MG-UNIT per tablet  Generic drug:  calcium carbonate-vitamin d   1 tablet, Oral, Daily      conjugated estrogens 0.625 MG/GM vaginal cream  Commonly known as:  PREMARIN   1 g, Vaginal, 3 Times Weekly, Mon,Wed,Fri       CoQ-10 200 MG capsule   1 capsule, Oral, Daily      levothyroxine 50 MCG tablet  Commonly known as:  SYNTHROID, LEVOTHROID   LEVOTHYROXINE SODIUM 50 MCG TABS      multivitamin with minerals tablet tablet   1 tablet, Oral, Daily      PROGRAF PO   10 mL, Oral, 4 Times Daily, Tacrolimus Liquid 1mg/1,000mL  Swish and Spit 10mL QID (pt usually uses BID-TID) Verified per [facility] where med is compounded. Instructed family to bring med.       raNITIdine 150 MG tablet  Commonly known as:  ZANTAC   150 mg, Oral, Daily      saccharomyces boulardii 250 MG capsule  Commonly known as:  FLORASTOR   250 mg, Oral, Daily      simvastatin 20 MG tablet  Commonly known as:  ZOCOR   20 mg, Oral, Nightly             Discharge Diet:   Diet Instructions     Diet: Cardiac      Discharge Diet:  Cardiac          Activity at Discharge:   Activity Instructions     Activity as Tolerated            Follow-up Appointments  Future Appointments   Date Time Provider Department Center   7/15/2019  2:30 PM Martinez Whittaker MD [facility]     Additional Instructions for the Follow-ups that You Need to Schedule     Call MD With Problems / Concerns   As directed      Instructions: Call [phone] or email [email or web address] for problems or concerns.    Order Comments:  Instructions: Call [phone] or email [email or web address] for problems or concerns.          Discharge Follow-up with PCP   As directed       Currently Documented PCP:    Gokul Piña MD    PCP Phone Number:    [phone]     Follow Up Details:  1 week               Test Results Pending at Discharge       Risk for Readmission (LACE) Score: 3 (7/11/2019  6:00 AM)          Rosy Chester MD  07/11/19  11:16 AM    Time: Discharge time 30 minutes            Electronically signed by Rosy Chester MD at 7/11/2019 11:17 AM

## 2019-07-11 NOTE — PLAN OF CARE
"Problem: Patient Care Overview  Goal: Plan of Care Review  Outcome: Ongoing (interventions implemented as appropriate)   07/11/19 0904   Coping/Psychosocial   Plan of Care Reviewed With patient;son   Plan of Care Review   Progress improving   OTHER   Outcome Summary Pt appears to be functioning close to baseline with resolution (and no return) of symptoms of dizziness/room spinning. Pt does report occasional \"lightheadness\" upon first standing but with vitals WNL and reports significant improvement from yesterday. [place] manuver not assessed this date, as pt with no signs/symptoms of BPPV this session. Anticipate safe d/c home and plan for OP PT to assess/treat vestibular/balance deficits if signs/symptoms persist/return. Updated POC to 3x/week at [facility] and if signs/symptoms return, assess for BPPV.         "

## 2019-07-11 NOTE — PROGRESS NOTES
Case Management Discharge Note    Final Note: Home       Final Discharge Disposition Code: 01 - home or self-care

## 2019-07-11 NOTE — THERAPY TREATMENT NOTE
"Acute Care - Physical Therapy Treatment Note   [facility]     Patient Name: Lucy Macias  : 1935  MRN: [id number]  Today's Date: 2019             Admit Date: 7/10/2019    Visit Dx:    ICD-10-CM ICD-9-CM   1. Dizzy R42 780.4   2. 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   • Hypothyroidism   • GERD without esophagitis   • CKD (chronic kidney disease) stage 3, GFR 30-59 ml/min (CMS/[facility])   • Acute hyperglycemia       Therapy Treatment    Rehabilitation Treatment Summary     Row Name 19             Treatment Time/Intention    Discipline  physical therapist  -AO      Document Type  therapy note (daily note)  -AO      Subjective Information  -- occasional lightheadness  -AO      Mode of Treatment  physical therapy  -AO      Patient/Family Observations  Pt supine in bed with son present; IV, HM  -AO      Care Plan Review  patient/other agree to care plan  -AO      Patient Effort  excellent  -AO      Comment  Pt reports the symptoms she was experiencing yesterday with dizziness/room spinning have resolved and that she is no longer SOA. Reports she feels \"a little lightheaded\" when first standing but overall feels very close to baseline.  -AO      Existing Precautions/Restrictions  no known precautions/restrictions  -AO      Recorded by [AO] Celina Leary, PT 19      Row Name 19             Vital Signs    Recovery Time  Vitals WNL throughout session and pt reports no shortness of air during gait training (improvement from yesterday's session)  -AO      Recorded by [AO] Celina Leary, PT 19      Row Name 19             Cognitive Assessment/Intervention- PT/OT    Orientation Status (Cognition)  oriented x 4  -AO      " Recorded by [AO] Celina Leary, PT 07/11/19 0902      Row Name 07/11/19 0836             Bed Mobility Assessment/Treatment    Bed Mobility Assessment/Treatment  supine-sit  -AO      Supine-Sit [place] (Bed Mobility)  conditional independence  -AO      Assistive Device (Bed Mobility)  bed rails  -AO      Recorded by [AO] Celina Leary, PT 07/11/19 0902      Row Name 07/11/19 0836             Transfer Assessment/Treatment    Transfer Assessment/Treatment  sit-stand transfer;stand-sit transfer  -AO      Recorded by [AO] Celina Leary, PT 07/11/19 0902      Row Name 07/11/19 0836             Sit-Stand Transfer    Sit-Stand [place] (Transfers)  conditional independence  -AO      Recorded by [AO] Celina Leary, PT 07/11/19 0902      Row Name 07/11/19 0836             Stand-Sit Transfer    Stand-Sit [place] (Transfers)  conditional independence  -AO      Recorded by [AO] Celina Leary, PT 07/11/19 0902      Row Name 07/11/19 0836             Gait/Stairs Assessment/Training    Gait/Stairs Assessment/Training  distance ambulated  -AO      [place] Level (Gait)  conditional independence  -AO      Assistive Device (Gait)  other (see comments) pushes IV pole  -AO      Distance in Feet (Gait)  250 ft  -AO      Pattern (Gait)  other (see comments) Reciprocal gait pattern with no noteable deficits  -AO      Recorded by [AO] Celina Leary, PT 07/11/19 0902      Row Name 07/11/19 0836             Positioning and Restraints    Pre-Treatment Position  in bed  -AO      Post Treatment Position  chair  -AO      In Chair  notified nsg;sitting;call light within reach;encouraged to call for assist;exit alarm on;with family/caregiver;with nsg  -AO      Recorded by [AO] Cleina Leary, PT 07/11/19 0902      Row Name 07/11/19 0836             Pain Assessment    Additional Documentation  Pain Scale: Numbers Pre/Post-Treatment (Group)  -AO      Recorded by [AO] Celina Leary, PT 07/11/19 0902      Row  Name 07/11/19 0836             Pain Scale: Numbers Pre/Post-Treatment    Pain Scale: Numbers, Pretreatment  0/10 - no pain  -AO      Pain Scale: Numbers, Post-Treatment  0/10 - no pain  -AO      Recorded by [AO] Celina Leary, PT 07/11/19 0902      Row Name 07/11/19 0836             Plan of Care Review    Plan of Care Reviewed With  patient;son  -AO      Recorded by [AO] Celina Leary, PT 07/11/19 0902      Row Name 07/11/19 0836             Outcome Summary/Treatment Plan (PT)    Daily Summary of Progress (PT)  progress toward functional goals is good  -AO      Plan for Continued Treatment (PT)  Updated POC to 3x/week at [facility] to monitor/progress mobility/balance  -AO      Anticipated Discharge Disposition (PT)  home with OP services  -AO      Recorded by [AO] Celina Leary, PT 07/11/19 0902        User Key  (r) = Recorded By, (t) = Taken By, (c) = Cosigned By    Initials Name Effective Dates Discipline    AO Celina Leary, PT 03/01/19 -  PT                   Physical Therapy Education     Title: PT OT SLP Therapies (Done)     Topic: Physical Therapy (Done)     Point: Mobility training (Done)     Learning Progress Summary           Patient Acceptance, E,TB, VU by  at 7/11/2019  9:02 AM    Comment:  Educated pt on POC and plan for outpatient phyiscal therapy services for vestibular/balance assessment/treatment as needed (if symptoms return or persist).    Acceptance, E, VU by  at 7/10/2019  5:10 PM                               User Key     Initials Effective Dates Name Provider Type Discipline     06/19/19 -  Francine Milan, PT Physical Therapist PT     03/01/19 -  Celina Leary, PT Physical Therapist PT                PT Recommendation and Plan  Anticipated Discharge Disposition (PT): home with OP services  Outcome Summary/Treatment Plan (PT)  Daily Summary of Progress (PT): progress toward functional goals is good  Plan for Continued Treatment (PT): Updated POC to 3x/week at [facility] to  "monitor/progress mobility/balance  Anticipated Discharge Disposition (PT): home with OP services  Plan of Care Reviewed With: patient, son  Progress: improving  Outcome Summary: Pt appears to be functioning close to baseline with resolution (and no return) of symptoms of dizziness/room spinning. Pt does report occasional \"lightheadness\" upon first standing but with vitals WNL and reports significant improvement from yesterday. Deann-Hallpike manuver not assessed this date, as pt with no signs/symptoms of BPPV this session. Anticipate safe d/c home and plan for OP PT to assess/treat vestibular/balance deficits if signs/symptoms persist/return. Updated POC to 3x/week at [facility] and if signs/symptoms return, assess for BPPV.     Time Calculation:   PT Charges     Row Name 07/11/19 0908             Time Calculation    Start Time  0836  -AO      Stop Time  0853  -AO      Time Calculation (min)  17 min  -AO      PT Received On  07/11/19  -AO      PT - Next Appointment  07/13/19  -AO        User Key  (r) = Recorded By, (t) = Taken By, (c) = Cosigned By    Initials Name Provider Type    AO Celina Leary, PT Physical Therapist        Therapy Charges for Today     Code Description Service Date Service Provider Modifiers Qty    94131215606 HC GAIT TRAINING EA 15 MIN 7/11/2019 Celina Leary, PT GP 1               Celina Leary, PT  7/11/2019       "

## 2019-07-11 NOTE — PLAN OF CARE
Problem: Patient Care Overview  Goal: Plan of Care Review  Outcome: Ongoing (interventions implemented as appropriate)   07/11/19 0700   Coping/Psychosocial   Plan of Care Reviewed With patient   Plan of Care Review   Progress improving     Goal: Individualization and Mutuality  Outcome: Ongoing (interventions implemented as appropriate)   07/11/19 0700   Individualization   Patient Specific Goals (Include Timeframe) pt will use call light when needed, and will have absence of falls per shift     Goal: Discharge Needs Assessment  Outcome: Ongoing (interventions implemented as appropriate)   07/11/19 0700   Discharge Needs Assessment   Readmission Within the Last 30 Days no previous admission in last 30 days   Concerns to be Addressed no discharge needs identified;denies needs/concerns at this time   Patient/Family Anticipates Transition to home with family   Patient/Family Anticipated Services at Transition none   Transportation Concerns car, none   Transportation Anticipated family or friend will provide   Anticipated Changes Related to Illness none   Equipment Needed After Discharge none   Disability   Equipment Currently Used at Home none       Problem: Fall Risk (Adult)  Goal: Identify Related Risk Factors and Signs and Symptoms  Outcome: Ongoing (interventions implemented as appropriate)   07/11/19 0700   Fall Risk (Adult)   Related Risk Factors (Fall Risk) age-related changes;gait/mobility problems   Signs and Symptoms (Fall Risk) presence of risk factors     Goal: Absence of Fall  Outcome: Ongoing (interventions implemented as appropriate)   07/11/19 0700   Fall Risk (Adult)   Absence of Fall making progress toward outcome       Problem: Activity Intolerance (Adult)  Goal: Identify Related Risk Factors and Signs and Symptoms  Outcome: Ongoing (interventions implemented as appropriate)   07/11/19 0700   Activity Intolerance (Adult)   Signs and Symptoms (Activity Intolerance) dyspnea/shortness of breath      Goal: Activity Tolerance  Outcome: Ongoing (interventions implemented as appropriate)   07/11/19 0700   Activity Intolerance (Adult)   Activity Tolerance making progress toward outcome     Goal: Effective Energy Conservation Techniques  Outcome: Ongoing (interventions implemented as appropriate)   07/11/19 0700   Activity Intolerance (Adult)   Effective Energy Conservation Techniques making progress toward outcome

## 2019-07-12 ENCOUNTER — READMISSION MANAGEMENT (OUTPATIENT)
Dept: CALL CENTER | Facility: HOSPITAL | Age: 84
End: 2019-07-12

## 2019-07-12 NOTE — OUTREACH NOTE
Prep Survey      Responses   Facility patient discharged from?  [facility]   Is patient eligible?  Yes   Discharge diagnosis  Dizziness,  CKD   Does the patient have one of the following disease processes/diagnoses(primary or secondary)?  Other   Does the patient have Home health ordered?  No   Is there a DME ordered?  No   Prep survey completed?  Yes          Vera Acharya RN

## 2019-07-15 ENCOUNTER — READMISSION MANAGEMENT (OUTPATIENT)
Dept: CALL CENTER | Facility: HOSPITAL | Age: 84
End: 2019-07-15

## 2019-07-15 NOTE — OUTREACH NOTE
Medical Week 1 Survey      Responses   Facility patient discharged from?  [facility]   Does the patient have one of the following disease processes/diagnoses(primary or secondary)?  Other   Is there a successful TCM telephone encounter documented?  No   Week 1 attempt successful?  Yes   Call start time  0938   Call end time  0945   Discharge diagnosis  Dizziness,  CKD   Meds reviewed with patient/caregiver?  Yes   Is the patient having any side effects they believe may be caused by any medication additions or changes?  No   Does the patient have all medications ordered at discharge?  Yes   Is the patient taking all medications as directed (includes completed medication regime)?  Yes   Does the patient have a primary care provider?   Yes   Does the patient have an appointment with their PCP within 7 days of discharge?  No   What is preventing the patient from scheduling follow up appointments within 7 days of discharge?  Haven't had time   Nursing Interventions  Urgent appointment needed   Urgent appointment interventions  Facilitated patient appointment [PT REQUESTED ASSISTANCE GETTING PCP APPT. SAME APPT MADE FOR 7/18/19 AT 0945 AND PT NOTIFIED OF DATE AND TIME. ]   Has the patient kept scheduled appointments due by today?  N/A   Has home health visited the patient within 72 hours of discharge?  N/A   Did the patient receive a copy of their discharge instructions?  Yes   Nursing interventions  Reviewed instructions with patient   What is the patient's perception of their health status since discharge?  Improving   Is the patient/caregiver able to teach back signs and symptoms related to disease process for when to call PCP?  Yes   Is the patient/caregiver able to teach back signs and symptoms related to disease process for when to call 911?  Yes   Is the patient/caregiver able to teach back the hierarchy of who to call/visit for symptoms/problems? PCP, Specialist, Home health nurse, Urgent Care, ED, 911  Yes   Week 1  call completed?  Yes   Graduated  Yes          Ciara Milan LPN

## 2019-07-22 ENCOUNTER — TREATMENT (OUTPATIENT)
Dept: PHYSICAL THERAPY | Facility: CLINIC | Age: 84
End: 2019-07-22

## 2019-07-22 DIAGNOSIS — R29.898 DEFICIENCIES OF LIMBS: ICD-10-CM

## 2019-07-22 DIAGNOSIS — R42 DIZZINESS AND GIDDINESS: Primary | ICD-10-CM

## 2019-07-22 DIAGNOSIS — R26.81 UNSTEADY GAIT: ICD-10-CM

## 2019-07-22 PROCEDURE — 97112 NEUROMUSCULAR REEDUCATION: CPT | Performed by: PHYSICAL THERAPIST

## 2019-07-22 PROCEDURE — 97161 PT EVAL LOW COMPLEX 20 MIN: CPT | Performed by: PHYSICAL THERAPIST

## 2019-07-22 NOTE — PROGRESS NOTES
Physical Therapy Initial Evaluation and Plan of Care    Patient: Lucy Macias   : 1935  Diagnosis/ICD-10 Code:  Dizziness and giddiness [R42]  Referring practitioner: Rosy Chester MD  Date of Initial Visit: 2019  Today's Date: 2019  Patient seen for 1 sessions           Subjective Questionnaire: DHI: 0%;  ABC:  99      Subjective Evaluation    History of Present Illness  Mechanism of injury: Pt reports she got up one morning to go to the bathroom and felt off balance;  She went back to bed and that is when the room started spinning.  She called her son and he came to help and took her to the ER.  They did heart tests and head CT and MRI and had labs and all was negative.  She did not have PT as she did not have symptoms but she did have an evaluation and they said she was safe to be DC home.  She cont to have no sympttoms.    Quality of life: excellent    Pain  Current pain ratin    Social Support  Lives in: multiple-level home (doesnt use basement;  4 step entry)  Lives with: alone (drives, (I) all ADLs, housework;  fam does yardwork)    Treatments  Discharged from (in last 30 days): inpatient hospitalization  Patient Goals  Patient goal: stay symptom free           Objective       Ambulation     Comments   UE strength:  bilat 4+ all joints  LE strength:  bilat hip flex 4-;  All others 4+ Including ankles    MCTSIB:  cond 1:  WFL                   cond 2:  WFL                   cond 3:  WFL                   cond 4:  10 sec with LOB right x 2;  15 sec with LOB post           Assessment & Plan     Assessment  Assessment details: Pt is an 84 y/o wf with a dx of dizziness onset July 10 with resolution of symptoms in the same day and pt was admitted to IP for a short time with PT eval indicating pt was symptom free and safe to go home.  OP PT was recommended if symptoms persisted or returned.  Pt came in today as there were orders for her to come but she is symptom free and she has been  since she was in the hospital.  She feels she is back to normal.    Goals  Plan Goals: STGs:  4 weeks  1.  Pt will be symptom free 100% of the time  2.  Pt has not experienced any LOB with normal daily activities  3.  Pt to score 100% function on outcomes measures as she did at [facility]  4.  Pt to call and come in for PT treatment if symptoms occur.    LTGs:  Will set as needed if pt returns for treatment    Plan  Therapy options: will be seen for skilled physical therapy services  Other planned modality interventions: pt will be on hold for 3 weeks and if she has a recurrence of symptoms she will call to make an appt for follow-up appt.  Planned therapy interventions: neuromuscular re-education  Other planned therapy interventions: canalith repositioning;  ther ex  Frequency: 1-2 x per week.  Duration in visits: 10        Timed:         Manual Therapy:         mins  07257;     Therapeutic Exercise:         mins  68501;     Neuromuscular Ian:  15      mins  34748;    Therapeutic Activity:          mins  12768;     Gait Training:           mins  47410;     Ultrasound:          mins  22408;    Ionto:                                   mins   29848  Self Care:                            mins   43506  Canalith Repos:                  mins   68687      Un-Timed:  Electrical Stimulation:         mins  58880 ( );  Traction          mins 14209  Low Eval     30     Mins  10788  Mod Eval          Mins  18768  High Eval                            Mins  73899  Re-Eval                               mins  31679        Timed Treatment:  15    mins   Total Treatment:     45   mins    PT SIGNATURE: Charisse Vick PT   DATE TREATMENT INITIATED: 7/22/2019    Initial Certification  Certification Period: 10/20/2019  I certify that the therapy services are furnished while this patient is under my care.  The services outlined above are required by this patient, and will be reviewed every 90 days.     PHYSICIAN: Rosy Chester,  MD      DATE:     Please sign and return via fax to [phone].. Thank you, [facility].

## 2019-07-29 RX ORDER — LEVOTHYROXINE SODIUM 0.07 MG/1
TABLET ORAL
COMMUNITY
Start: 2019-07-05

## 2019-08-12 ENCOUNTER — OFFICE VISIT (OUTPATIENT)
Dept: CARDIOLOGY | Facility: CLINIC | Age: 84
End: 2019-08-12

## 2019-08-12 VITALS
HEART RATE: 65 BPM | DIASTOLIC BLOOD PRESSURE: 78 MMHG | WEIGHT: 173.4 LBS | SYSTOLIC BLOOD PRESSURE: 175 MMHG | OXYGEN SATURATION: 96 % | BODY MASS INDEX: 28.86 KG/M2

## 2019-08-12 DIAGNOSIS — I25.118 CORONARY ARTERY DISEASE OF NATIVE ARTERY OF NATIVE HEART WITH STABLE ANGINA PECTORIS (HCC): Primary | ICD-10-CM

## 2019-08-12 DIAGNOSIS — I10 ESSENTIAL HYPERTENSION: ICD-10-CM

## 2019-08-12 DIAGNOSIS — E78.00 PURE HYPERCHOLESTEROLEMIA: ICD-10-CM

## 2019-08-12 DIAGNOSIS — R42 DIZZINESS: ICD-10-CM

## 2019-08-12 PROCEDURE — 99213 OFFICE O/P EST LOW 20 MIN: CPT | Performed by: INTERNAL MEDICINE

## 2019-08-12 NOTE — PROGRESS NOTES
Subjective:     Encounter Date:08/12/2019      Patient ID: Lucy Macias is a 83 y.o. female.    Chief Complaint:  History of Present Illness 83-year-old white female with history of coronary disease hypertension hyperlipidemia hypothyroidism and chronic renal insufficiency presents to my office for follow-up.  Patient was in the hospital recently with complaints of dizziness and was ruled out for MI.  She also seen by neurologist and then was diagnosed with vertigo and placed on medical therapy.  No complaints of any chest pain or shortness of breath at this time.  No PND orthopnea.  No palpitations syncope or swelling of the feet.  She is taking her medicines regularly.    The following portions of the patient's history were reviewed and updated as appropriate: allergies, current medications, past family history, past medical history, past social history, past surgical history and problem list.  Past Medical History:   Diagnosis Date   • Arthritis    • CKD (chronic kidney disease) stage 3, GFR 30-59 ml/min (CMS/[facility])    • Coronary artery disease    • GERD (gastroesophageal reflux disease)    • Hyperlipidemia    • Hypertension    • Hypothyroidism    • Rotator cuff tear, left    • Urinary tract infection    • Vertigo      Past Surgical History:   Procedure Laterality Date   • BREAST MASS EXCISION Right     Breast tumor R   • CARDIAC CATHETERIZATION     • CHOLECYSTECTOMY     • ENDOSCOPY     • HEMORRHOIDECTOMY     • PARTIAL HYSTERECTOMY     • SHOULDER ARTHROSCOPY W/ ROTATOR CUFF REPAIR Left    • TONSILLECTOMY       /78 (BP Location: Right arm, Patient Position: Sitting)   Pulse 65   Wt 78.7 kg (173 lb 6.4 oz)   SpO2 96%   BMI 28.86 kg/m²   Family History   Problem Relation Age of Onset   • Heart disease Sister         MI   • Hypertension Other    • Other Other         Bypass Surgery        Current Outpatient Medications:   •  aspirin 81 MG chewable tablet, Chew 81 mg Daily., Disp: , Rfl:   •   atenolol (TENORMIN) 25 MG tablet, Take 25 mg by mouth 2 (Two) Times a Day., Disp: , Rfl:   •  calcium carbonate-vitamin d (CALTRATE 600+D) 600-400 MG-UNIT per tablet, Take 1 tablet by mouth Daily., Disp: , Rfl:   •  Coenzyme Q10 (COQ-10) 200 MG capsule, Take 1 capsule by mouth Daily., Disp: , Rfl:   •  conjugated estrogens (PREMARIN) 0.625 MG/GM vaginal cream, Insert 1 g into the vagina 3 (Three) Times a Week. Mon,Wed,Fri, Disp: , Rfl:   •  levothyroxine (SYNTHROID, LEVOTHROID) 75 MCG tablet, , Disp: , Rfl:   •  Multiple Vitamins-Minerals (MULTIVITAMIN WITH MINERALS) tablet tablet, Take 1 tablet by mouth Daily., Disp: , Rfl:   •  Probiotic Product (PROBIOTIC-10) capsule, Take  by mouth., Disp: , Rfl:   •  raNITIdine (ZANTAC) 150 MG tablet, Take 150 mg by mouth Daily., Disp: , Rfl:   •  simvastatin (ZOCOR) 20 MG tablet, Take 20 mg by mouth Every Night., Disp: , Rfl:   •  Tacrolimus (PROGRAF PO), Take 10 mL by mouth 4 (Four) Times a Day. Tacrolimus Liquid 1mg/1,000mL  Swish and Spit 10mL QID (pt usually uses BID-TID) Verified per [facility] pharmacy where med is compounded. Instructed family to bring med., Disp: , Rfl:   •  levothyroxine (SYNTHROID, LEVOTHROID) 50 MCG tablet, LEVOTHYROXINE SODIUM 50 MCG TABS, Disp: , Rfl:   •  meclizine (ANTIVERT) 25 MG tablet, Take 1 tablet by mouth 3 (Three) Times a Day As Needed for dizziness., Disp: 30 tablet, Rfl: 0  •  saccharomyces boulardii (FLORASTOR) 250 MG capsule, Take 250 mg by mouth Daily., Disp: , Rfl:   Allergies   Allergen Reactions   • Clindamycin Hcl Itching   • Tetracycline Itching     Social History     Socioeconomic History   • Marital status:      Spouse name: Not on file   • Number of children: Not on file   • Years of education: Not on file   • Highest education level: Not on file   Tobacco Use   • Smoking status: Never Smoker   • Smokeless tobacco: Never Used   Substance and Sexual Activity   • Alcohol use: No     Frequency: Never   • Drug use: No   •  Sexual activity: Defer     Review of Systems   Constitution: Positive for malaise/fatigue. Negative for fever.   Cardiovascular: Negative for chest pain, dyspnea on exertion and palpitations.   Respiratory: Negative for cough and shortness of breath.    Skin: Negative for rash.   Gastrointestinal: Negative for abdominal pain, nausea and vomiting.   Neurological: Positive for light-headedness and numbness. Negative for focal weakness and headaches.   All other systems reviewed and are negative.             Objective:     Physical Exam   Constitutional: She appears well-developed and well-nourished.   HENT:   Head: Normocephalic and atraumatic.   Eyes: Conjunctivae are normal. No scleral icterus.   Neck: Normal range of motion. Neck supple. No JVD present. Carotid bruit is not present.   Cardiovascular: Normal rate, regular rhythm, S1 normal, S2 normal, normal heart sounds and intact distal pulses. PMI is not displaced.   Pulmonary/Chest: Effort normal and breath sounds normal. She has no wheezes. She has no rales.   Abdominal: Soft. Bowel sounds are normal.   Neurological: She is alert. She has normal strength.   Skin: Skin is warm and dry. No rash noted.     Procedures    Lab Review:       Assessment:          Diagnosis Plan   1. Coronary artery disease of native artery of native heart with stable angina pectoris (CMS/HCC)     2. Essential hypertension     3. Pure hypercholesterolemia     4. Dizziness            Plan:       Dizziness which is probably secondary to vertigo.  Patient has coronary disease with nonobstructive disease and normally function is stable.  Patient is currently stable on medications.  Blood pressure and heart rate are stable although her systolic blood pressure is high in the office because of whitecoat hypertension.  Patient's lipid levels are followed by primary care doctor.

## 2019-09-12 ENCOUNTER — OFFICE VISIT (OUTPATIENT)
Dept: PHYSICAL THERAPY | Facility: CLINIC | Age: 84
End: 2019-09-12

## 2019-09-12 DIAGNOSIS — R42 DIZZINESS AND GIDDINESS: Primary | ICD-10-CM

## 2019-09-12 DIAGNOSIS — R29.898 DEFICIENCIES OF LIMBS: ICD-10-CM

## 2019-09-12 DIAGNOSIS — R26.81 UNSTEADY GAIT: ICD-10-CM

## 2019-09-12 PROCEDURE — 97112 NEUROMUSCULAR REEDUCATION: CPT | Performed by: PHYSICAL THERAPIST

## 2019-09-12 PROCEDURE — 97110 THERAPEUTIC EXERCISES: CPT | Performed by: PHYSICAL THERAPIST

## 2019-09-12 NOTE — PROGRESS NOTES
Physical Therapy Daily Progress Note    VISIT#: 2    Subjective Evaluation    History of Present Illness  Mechanism of injury: Pt reports she is wondering if she is not having any problems does she need to do any therapy?  She has not had any spinning at all; she has been very tired recently but she had a meds change and it is better.      Pain  Current pain ratin             Objective     EVAL:      Comments   UE strength:  bilat 4+ all joints  LE strength:  bilat hip flex 4-;  All others 4+ Including ankles     MCTSIB:  cond 1:  WFL                   cond 2:  WFL                   cond 3:  WFL                   cond 4:  10 sec with LOB right x 2;  15 sec with LOB post    TODAY:      Left LE:  All 4+ except hip flex is 4-/4  Right LE:  All 4+     BUE:  All 4 to 4+    DGI:       MCTSIB:  No change since eval       See Exercise, Manual, and Modality Logs for complete treatment.   Initiated there ex and NMR today after re-assessment        Assessment & Plan     Assessment  Assessment details: Pt had no real changes since eval in July.  She scored well on DGI but not on steps and had some unsteadiness with gait with head movements.            Progress per Plan of Care            Timed:         Manual Therapy:         mins  69324;     Therapeutic Exercise:  15       mins  89784;     Neuromuscular Ian: 45       mins  15549;    Therapeutic Activity:          mins  22005;     Gait Training:           mins  79524;     Ultrasound:          mins  76635;    Ionto                                   mins   89771  Self Care                            mins   76741  Canalith Repos                   mins  4209    Un-Timed:  Electrical Stimulation:         mins  17318 ( );  Dry Needling          mins self-pay  Traction          mins 84588  Low Eval          Mins  81798  Mod Eval          Mins  53186  High Eval                            Mins  62097  Re-Eval                               mins  49589    Timed Treatment:   60    mins   Total Treatment:   60     mins    Charisse Vick, PT  Physical Therapist

## 2019-10-03 ENCOUNTER — TREATMENT (OUTPATIENT)
Dept: PHYSICAL THERAPY | Facility: CLINIC | Age: 84
End: 2019-10-03

## 2019-10-03 DIAGNOSIS — R42 DIZZINESS AND GIDDINESS: Primary | ICD-10-CM

## 2019-10-03 DIAGNOSIS — R26.81 UNSTEADY GAIT: ICD-10-CM

## 2019-10-03 DIAGNOSIS — R29.898 DEFICIENCIES OF LIMBS: ICD-10-CM

## 2019-10-03 PROCEDURE — 97112 NEUROMUSCULAR REEDUCATION: CPT | Performed by: PHYSICAL THERAPIST

## 2019-10-03 PROCEDURE — 97110 THERAPEUTIC EXERCISES: CPT | Performed by: PHYSICAL THERAPIST

## 2019-10-03 NOTE — PROGRESS NOTES
Physical Therapy Daily Progress Note    VISIT#: 3    Subjective Evaluation    History of Present Illness  Mechanism of injury: Pt started out doing the exercises I gave her then her right knee started to hurt and give out on her so she stopped the exercises.  The pain seemed to be brand new as she had not had it before.  She had difficulty with walking and turning her head.  The pain went away after she stopped them.    Pain  Current pain ratin             Objective     See Exercise, Manual, and Modality Logs for complete treatment.     Reviewed all HEP: changed steps to either ex or set of steps at home; others stay the same; pt had some difficulty with walking with head turns but did well after practicing      Patient Education:  Cont with previous ex    Assessment & Plan     Assessment  Assessment details: Pt supriya well with some hesitation but did well after practicing.          Progress per Plan of Care            Timed:         Manual Therapy:         mins  93243;     Therapeutic Exercise:   15      mins  98069;     Neuromuscular Ian: 30       mins  67254;    Therapeutic Activity:          mins  52221;     Gait Training:           mins  39998;     Ultrasound:          mins  28709;    Ionto                                   mins   73672  Self Care                            mins   56630  Canalith Repos                   mins  4209    Un-Timed:  Electrical Stimulation:         mins  11901 ( );  Dry Needling          mins self-pay  Traction          mins 03874  Low Eval          Mins  66390  Mod Eval          Mins  55915  High Eval                            Mins  46555  Re-Eval                               mins  32007    Timed Treatment:  45    mins   Total Treatment:    45    mins    Charisse Vick PT  Physical Therapist

## 2019-11-06 ENCOUNTER — DOCUMENTATION (OUTPATIENT)
Dept: PHYSICAL THERAPY | Facility: CLINIC | Age: 84
End: 2019-11-06

## 2019-11-06 NOTE — PROGRESS NOTES
Discharge Summary  Discharge Summary from Physical Therapy Report      Number of Visits: 3     Goals: Not Met    Discharge Plan: Continue with current home exercise program as instructed    Comments pt called to say she has not been very compliant with her HEP recently and she is also having knee pain and thumb pain. She feels llike she should be DC from PT for now.      Date of Discharge 11-6-19        Charisse Vick, PT  Physical Therapist

## 2019-12-14 PROCEDURE — 87086 URINE CULTURE/COLONY COUNT: CPT | Performed by: FAMILY MEDICINE

## 2020-07-08 ENCOUNTER — OFFICE VISIT (OUTPATIENT)
Dept: CARDIOLOGY | Facility: CLINIC | Age: 85
End: 2020-07-08

## 2020-07-08 VITALS
BODY MASS INDEX: 28.32 KG/M2 | OXYGEN SATURATION: 97 % | WEIGHT: 170 LBS | HEART RATE: 82 BPM | SYSTOLIC BLOOD PRESSURE: 165 MMHG | DIASTOLIC BLOOD PRESSURE: 94 MMHG | HEIGHT: 65 IN

## 2020-07-08 DIAGNOSIS — I10 ESSENTIAL HYPERTENSION: ICD-10-CM

## 2020-07-08 DIAGNOSIS — E78.00 PURE HYPERCHOLESTEROLEMIA: ICD-10-CM

## 2020-07-08 DIAGNOSIS — I25.118 CORONARY ARTERY DISEASE OF NATIVE ARTERY OF NATIVE HEART WITH STABLE ANGINA PECTORIS (HCC): Primary | ICD-10-CM

## 2020-07-08 PROCEDURE — 99213 OFFICE O/P EST LOW 20 MIN: CPT | Performed by: INTERNAL MEDICINE

## 2020-07-08 NOTE — PROGRESS NOTES
"    Subjective:     Encounter Date:07/08/2020      Patient ID: Lucy Macias is a 84 y.o. female.    Chief Complaint:  History of Present Illness 84-year-old white female with history of coronary disease hypertension hyperlipidemia and chronic renal insufficiency presents to my office for follow-up.  Patient is currently stable without any symptoms of chest pain at rest or exertion but no shortness of breath with exertion.  No complains any PND orthopnea.  No palpitations dizziness syncope or swelling of the feet.  Patient is taking all the medicines regularly.  Patient does not smoke.  Patient continues to exercise very well  The following portions of the patient's history were reviewed and updated as appropriate: allergies, current medications, past family history, past medical history, past social history, past surgical history and problem list.  Past Medical History:   Diagnosis Date   • Arthritis    • CKD (chronic kidney disease) stage 3, GFR 30-59 ml/min (CMS/[facility])    • Coronary artery disease    • GERD (gastroesophageal reflux disease)    • Hyperlipidemia    • Hypertension    • Hypothyroidism    • Rotator cuff tear, left    • Urinary tract infection    • Vertigo      Past Surgical History:   Procedure Laterality Date   • BREAST MASS EXCISION Right     Breast tumor R   • CARDIAC CATHETERIZATION     • CHOLECYSTECTOMY     • ENDOSCOPY     • HEMORRHOIDECTOMY     • SHOULDER ARTHROSCOPY W/ ROTATOR CUFF REPAIR Left    • SUBTOTAL HYSTERECTOMY     • TONSILLECTOMY       /94 (BP Location: Left arm, Patient Position: Sitting)   Pulse 82   Ht 165.1 cm (65\")   Wt 77.1 kg (170 lb)   SpO2 97%   BMI 28.29 kg/m²   Family History   Problem Relation Age of Onset   • Heart disease Sister         MI   • Hypertension Other    • Other Other         Bypass Surgery        Current Outpatient Medications:   •  aspirin 81 MG chewable tablet, Chew 81 mg Daily., Disp: , Rfl:   •  atenolol (TENORMIN) 25 MG tablet, Take 25 mg " by mouth Daily., Disp: , Rfl:   •  calcium carbonate-vitamin d (CALTRATE 600+D) 600-400 MG-UNIT per tablet, Take 1 tablet by mouth Daily., Disp: , Rfl:   •  Coenzyme Q10 (COQ-10) 200 MG capsule, Take 1 capsule by mouth Daily., Disp: , Rfl:   •  conjugated estrogens (PREMARIN) 0.625 MG/GM vaginal cream, Insert 1 g into the vagina 3 (Three) Times a Week. Mon,Wed,Fri, Disp: , Rfl:   •  famotidine (PEPCID) 40 MG tablet, , Disp: , Rfl:   •  levothyroxine (SYNTHROID, LEVOTHROID) 75 MCG tablet, , Disp: , Rfl:   •  metroNIDAZOLE (METROCREAM) 0.75 % cream, Apply  topically to the appropriate area as directed 2 (Two) Times a Day., Disp: , Rfl:   •  Mirabegron ER (Myrbetriq) 50 MG tablet sustained-release 24 hour 24 hr tablet, Take 50 mg by mouth Daily. Every other day, Disp: , Rfl:   •  Multiple Vitamins-Minerals (MULTIVITAMIN WITH MINERALS) tablet tablet, Take 1 tablet by mouth Daily., Disp: , Rfl:   •  Probiotic Product (PROBIOTIC-10) capsule, Take  by mouth., Disp: , Rfl:   •  simvastatin (ZOCOR) 20 MG tablet, Take 20 mg by mouth Every Night., Disp: , Rfl:   •  Tacrolimus (PROGRAF PO), Take 10 mL by mouth 4 (Four) Times a Day. Tacrolimus Liquid 1mg/1,000mL  Swish and Spit 10mL QID (pt usually uses BID-TID) Verified per [facility] pharmacy where med is compounded. Instructed family to bring med., Disp: , Rfl:   Allergies   Allergen Reactions   • Clindamycin Hcl Itching   • Tetracycline Itching     Social History     Socioeconomic History   • Marital status:      Spouse name: Not on file   • Number of children: Not on file   • Years of education: Not on file   • Highest education level: Not on file   Tobacco Use   • Smoking status: Never Smoker   • Smokeless tobacco: Never Used   Substance and Sexual Activity   • Alcohol use: No     Frequency: Never   • Drug use: No   • Sexual activity: Defer     Review of Systems   Constitution: Positive for malaise/fatigue. Negative for fever.   Cardiovascular: Positive for dyspnea  on exertion. Negative for chest pain and palpitations.   Respiratory: Negative for cough and shortness of breath.    Skin: Negative for rash.   Gastrointestinal: Negative for abdominal pain, nausea and vomiting.   Neurological: Negative for focal weakness and headaches.   All other systems reviewed and are negative.             Objective:     Physical Exam   Constitutional: She appears well-developed and well-nourished.   HENT:   Head: Normocephalic and atraumatic.   Eyes: Conjunctivae are normal. No scleral icterus.   Neck: Normal range of motion. Neck supple. No JVD present. Carotid bruit is not present.   Cardiovascular: Normal rate, regular rhythm, S1 normal, S2 normal, normal heart sounds and intact distal pulses. PMI is not displaced.   Pulmonary/Chest: Effort normal and breath sounds normal. She has no wheezes. She has no rales.   Abdominal: Soft. Bowel sounds are normal.   Neurological: She is alert. She has normal strength.   Skin: Skin is warm and dry. No rash noted.     Procedures    Lab Review:       Assessment:          Diagnosis Plan   1. Coronary artery disease of native artery of native heart with stable angina pectoris (CMS/HCC)     2. Essential hypertension     3. Pure hypercholesterolemia            Plan:       Patient has history of coronary disease and is currently stable on medical therapy  Patient has normal LV function  Patient blood pressure heart rate stable  Patient lipid levels are followed by the primary care doctor.  Continue current medicines and follow in 6-month

## 2021-03-17 ENCOUNTER — OFFICE VISIT (OUTPATIENT)
Dept: CARDIOLOGY | Facility: CLINIC | Age: 86
End: 2021-03-17

## 2021-03-17 VITALS
WEIGHT: 174 LBS | DIASTOLIC BLOOD PRESSURE: 99 MMHG | HEIGHT: 65 IN | HEART RATE: 80 BPM | TEMPERATURE: 97.1 F | OXYGEN SATURATION: 95 % | BODY MASS INDEX: 28.99 KG/M2 | SYSTOLIC BLOOD PRESSURE: 180 MMHG

## 2021-03-17 DIAGNOSIS — N18.9 CHRONIC RENAL IMPAIRMENT, UNSPECIFIED CKD STAGE: ICD-10-CM

## 2021-03-17 DIAGNOSIS — E78.00 PURE HYPERCHOLESTEROLEMIA: ICD-10-CM

## 2021-03-17 DIAGNOSIS — I10 ESSENTIAL HYPERTENSION: ICD-10-CM

## 2021-03-17 DIAGNOSIS — I25.118 CORONARY ARTERY DISEASE OF NATIVE ARTERY OF NATIVE HEART WITH STABLE ANGINA PECTORIS (HCC): Primary | ICD-10-CM

## 2021-03-17 PROCEDURE — 99214 OFFICE O/P EST MOD 30 MIN: CPT | Performed by: INTERNAL MEDICINE

## 2021-03-17 NOTE — PROGRESS NOTES
"    Subjective:     Encounter Date:03/17/2021      Patient ID: Lucy Macias is a 85 y.o. female.    Chief Complaint:  History of Present Illness 85-year-old white female with history of coronary disease history of hypertension hyperlipidemia presents to my office for follow-up.  Patient is currently stable without any signs of chest pain or shortness of breath at rest but has some shortness of breath with exertion.  No complaints any PND orthopnea.  No palpitation dizziness.  No syncope or swelling of the feet.  She is taking her medicines regularly.  She does not smoke.  She is trying to exercise regularly she follows a good diet.    The following portions of the patient's history were reviewed and updated as appropriate: allergies, current medications, past family history, past medical history, past social history, past surgical history and problem list.  Past Medical History:   Diagnosis Date   • Arthritis    • CKD (chronic kidney disease) stage 3, GFR 30-59 ml/min (CMS/[facility])    • Coronary artery disease    • GERD (gastroesophageal reflux disease)    • Hyperlipidemia    • Hypertension    • Hypothyroidism    • Rotator cuff tear, left    • Urinary tract infection    • Vertigo      Past Surgical History:   Procedure Laterality Date   • BREAST MASS EXCISION Right     Breast tumor R   • CARDIAC CATHETERIZATION     • CHOLECYSTECTOMY     • ENDOSCOPY     • HEMORRHOIDECTOMY     • SHOULDER ARTHROSCOPY W/ ROTATOR CUFF REPAIR Left    • SUBTOTAL HYSTERECTOMY     • TONSILLECTOMY       /99   Pulse 80   Temp 97.1 °F (36.2 °C)   Ht 165.1 cm (65\")   Wt 78.9 kg (174 lb)   SpO2 95%   BMI 28.96 kg/m²   Family History   Problem Relation Age of Onset   • Heart disease Sister         MI   • Hypertension Other    • Other Other         Bypass Surgery        Current Outpatient Medications:   •  aspirin 81 MG chewable tablet, Chew 81 mg Daily., Disp: , Rfl:   •  atenolol (TENORMIN) 25 MG tablet, Take 25 mg by mouth " Daily., Disp: , Rfl:   •  calcium carbonate-vitamin d (CALTRATE 600+D) 600-400 MG-UNIT per tablet, Take 1 tablet by mouth Daily., Disp: , Rfl:   •  Coenzyme Q10 (COQ-10) 200 MG capsule, Take 1 capsule by mouth Daily., Disp: , Rfl:   •  conjugated estrogens (PREMARIN) 0.625 MG/GM vaginal cream, Insert 1 g into the vagina 3 (Three) Times a Week. Mon,Wed,Fri, Disp: , Rfl:   •  famotidine (PEPCID) 40 MG tablet, , Disp: , Rfl:   •  levothyroxine (SYNTHROID, LEVOTHROID) 75 MCG tablet, , Disp: , Rfl:   •  metroNIDAZOLE (METROCREAM) 0.75 % cream, Apply  topically to the appropriate area as directed 2 (Two) Times a Day., Disp: , Rfl:   •  Mirabegron ER (Myrbetriq) 50 MG tablet sustained-release 24 hour 24 hr tablet, Take 50 mg by mouth Daily. Every other day, Disp: , Rfl:   •  Multiple Vitamins-Minerals (MULTIVITAMIN WITH MINERALS) tablet tablet, Take 1 tablet by mouth Daily., Disp: , Rfl:   •  Probiotic Product (PROBIOTIC-10) capsule, Take  by mouth., Disp: , Rfl:   •  simvastatin (ZOCOR) 20 MG tablet, Take 20 mg by mouth Every Night., Disp: , Rfl:   •  Tacrolimus (PROGRAF PO), Take 10 mL by mouth 4 (Four) Times a Day. Tacrolimus Liquid 1mg/1,000mL  Swish and Spit 10mL QID (pt usually uses BID-TID) Verified per [facility] pharmacy where med is compounded. Instructed family to bring med., Disp: , Rfl:   Allergies   Allergen Reactions   • Clindamycin Hcl Itching   • Tetracycline Itching     Social History     Socioeconomic History   • Marital status:      Spouse name: Not on file   • Number of children: Not on file   • Years of education: Not on file   • Highest education level: Not on file   Tobacco Use   • Smoking status: Never Smoker   • Smokeless tobacco: Never Used   Substance and Sexual Activity   • Alcohol use: No   • Drug use: No   • Sexual activity: Defer     Review of Systems   Constitutional: Negative for fever and malaise/fatigue.   Cardiovascular: Negative for chest pain, dyspnea on exertion, leg swelling  and palpitations.   Respiratory: Positive for shortness of breath. Negative for cough.    Skin: Negative for rash.   Gastrointestinal: Negative for abdominal pain, nausea and vomiting.   Neurological: Positive for light-headedness. Negative for focal weakness, headaches and numbness.   All other systems reviewed and are negative.             Objective:     Constitutional:       Appearance: Well-developed.   Eyes:      General: No scleral icterus.     Conjunctiva/sclera: Conjunctivae normal.   HENT:      Head: Normocephalic and atraumatic.   Neck:      Vascular: No carotid bruit or JVD.   Pulmonary:      Effort: Pulmonary effort is normal.      Breath sounds: Normal breath sounds. No wheezing. No rales.   Cardiovascular:      Normal rate. Regular rhythm.   Pulses:     Intact distal pulses.   Abdominal:      General: Bowel sounds are normal.      Palpations: Abdomen is soft.   Musculoskeletal:      Cervical back: Normal range of motion and neck supple. Skin:     General: Skin is warm and dry.      Findings: No rash.   Neurological:      Mental Status: Alert.       Procedures    Lab Review:         MDM  1.  Coronary artery disease  Patient has nonobstructive coronary disease with normal LV systolic function is currently stable on medications  2.  Hypertension  Patient blood pressure very high and she is on atenolol 25 mg once a day and I will add amlodipine 5 mg once a day and watch her blood pressure  3.  Hyperlipidemia  Patient is on statins and her lipids are followed by the primary care doctor  4.  Chronic renal sufficiency  Patient's renal function is followed by the primary care doctor

## 2021-03-18 ENCOUNTER — TELEPHONE (OUTPATIENT)
Dept: CARDIOLOGY | Facility: CLINIC | Age: 86
End: 2021-03-18

## 2021-03-18 NOTE — TELEPHONE ENCOUNTER
NEW RX GIVEN TO HER YESTERDAY  FOR AMLODIPINE 5 MG, MAKES HER ANKLE SWELL,  ALSO SHE CAN NOT TAKE METOPROLOL AND HYDRALAZINE, OTHER MED TO TRY?  PLEASE ADVISE

## 2021-07-23 PROCEDURE — U0003 INFECTIOUS AGENT DETECTION BY NUCLEIC ACID (DNA OR RNA); SEVERE ACUTE RESPIRATORY SYNDROME CORONAVIRUS 2 (SARS-COV-2) (CORONAVIRUS DISEASE [COVID-19]), AMPLIFIED PROBE TECHNIQUE, MAKING USE OF HIGH THROUGHPUT TECHNOLOGIES AS DESCRIBED BY CMS-2020-01-R: HCPCS

## 2021-09-30 ENCOUNTER — OFFICE VISIT (OUTPATIENT)
Dept: CARDIOLOGY | Facility: CLINIC | Age: 86
End: 2021-09-30

## 2021-09-30 VITALS
HEART RATE: 71 BPM | HEIGHT: 65 IN | WEIGHT: 173 LBS | SYSTOLIC BLOOD PRESSURE: 169 MMHG | DIASTOLIC BLOOD PRESSURE: 94 MMHG | BODY MASS INDEX: 28.82 KG/M2 | OXYGEN SATURATION: 96 %

## 2021-09-30 DIAGNOSIS — I25.118 CORONARY ARTERY DISEASE OF NATIVE ARTERY OF NATIVE HEART WITH STABLE ANGINA PECTORIS (HCC): Primary | ICD-10-CM

## 2021-09-30 DIAGNOSIS — E78.00 PURE HYPERCHOLESTEROLEMIA: ICD-10-CM

## 2021-09-30 DIAGNOSIS — I10 ESSENTIAL HYPERTENSION: ICD-10-CM

## 2021-09-30 PROCEDURE — 99213 OFFICE O/P EST LOW 20 MIN: CPT | Performed by: INTERNAL MEDICINE

## 2021-09-30 NOTE — PROGRESS NOTES
"    Subjective:     Encounter Date:09/30/2021      Patient ID: Lucy Macias is a 85 y.o. female.    Chief Complaint:  History of Present Illness 85-year-old white female with history of coronary disease hypertension hyperlipidemia presents to my office for follow-up.  Patient is currently stable without any signs of chest pain or shortness of breath at rest on exertion.  No symptoms any PND orthopnea.  No palpitations but has occasional dizziness.  No syncope or swelling of the feet.  Patient is trying to exercise regularly she follows a good diet.    The following portions of the patient's history were reviewed and updated as appropriate: allergies, current medications, past family history, past medical history, past social history, past surgical history and problem list.  Past Medical History:   Diagnosis Date   • Arthritis    • CKD (chronic kidney disease) stage 3, GFR 30-59 ml/min (CMS/HCC)    • Coronary artery disease    • GERD (gastroesophageal reflux disease)    • Hyperlipidemia    • Hypertension    • Hypothyroidism    • Rotator cuff tear, left    • Urinary tract infection    • Vertigo      Past Surgical History:   Procedure Laterality Date   • BREAST MASS EXCISION Right     Breast tumor R   • CARDIAC CATHETERIZATION     • CHOLECYSTECTOMY     • ENDOSCOPY     • HEMORRHOIDECTOMY     • SHOULDER ARTHROSCOPY W/ ROTATOR CUFF REPAIR Left    • SUBTOTAL HYSTERECTOMY     • TONSILLECTOMY       /94 (BP Location: Left arm, Patient Position: Sitting, Cuff Size: Adult)   Pulse 71   Ht 165.1 cm (65\")   Wt 78.5 kg (173 lb)   SpO2 96%   BMI 28.79 kg/m²   Family History   Problem Relation Age of Onset   • Heart disease Sister         MI   • Hypertension Other    • Other Other         Bypass Surgery        Current Outpatient Medications:   •  aspirin 81 MG chewable tablet, Chew 81 mg Daily., Disp: , Rfl:   •  atenolol (TENORMIN) 25 MG tablet, Take 50 mg by mouth Daily., Disp: , Rfl:   •  calcium " carbonate-vitamin d (CALTRATE 600+D) 600-400 MG-UNIT per tablet, Take 1 tablet by mouth Daily., Disp: , Rfl:   •  Coenzyme Q10 (COQ-10) 200 MG capsule, Take 1 capsule by mouth Daily., Disp: , Rfl:   •  conjugated estrogens (PREMARIN) 0.625 MG/GM vaginal cream, Insert 1 g into the vagina 3 (Three) Times a Week. Mon,Wed,Fri, Disp: , Rfl:   •  famotidine (PEPCID) 40 MG tablet, , Disp: , Rfl:   •  levothyroxine (SYNTHROID, LEVOTHROID) 75 MCG tablet, , Disp: , Rfl:   •  Mirabegron ER (Myrbetriq) 50 MG tablet sustained-release 24 hour 24 hr tablet, Take 50 mg by mouth Daily. Every other day, Disp: , Rfl:   •  Multiple Vitamins-Minerals (MULTIVITAMIN WITH MINERALS) tablet tablet, Take 1 tablet by mouth Daily., Disp: , Rfl:   •  Probiotic Product (PROBIOTIC-10) capsule, Take  by mouth., Disp: , Rfl:   •  simvastatin (ZOCOR) 20 MG tablet, Take 20 mg by mouth Every Night., Disp: , Rfl:   •  Tacrolimus (PROGRAF PO), Take 10 mL by mouth 4 (Four) Times a Day. Tacrolimus Liquid 1mg/1,000mL  Swish and Spit 10mL QID (pt usually uses BID-TID) Verified per [facility] pharmacy where med is compounded. Instructed family to bring med., Disp: , Rfl:   Allergies   Allergen Reactions   • Clindamycin Hcl Itching   • Tetracycline Itching   • Bactrim [Sulfamethoxazole-Trimethoprim] Other (See Comments)     Unknown      Social History     Socioeconomic History   • Marital status:      Spouse name: Not on file   • Number of children: Not on file   • Years of education: Not on file   • Highest education level: Not on file   Tobacco Use   • Smoking status: Never Smoker   • Smokeless tobacco: Never Used   Substance and Sexual Activity   • Alcohol use: No   • Drug use: No   • Sexual activity: Defer     Review of Systems   Constitutional: Positive for malaise/fatigue. Negative for fever.   Cardiovascular: Negative for chest pain, dyspnea on exertion and palpitations.   Respiratory: Negative for cough and shortness of breath.    Skin: Negative  for rash.   Gastrointestinal: Negative for abdominal pain, nausea and vomiting.   Neurological: Positive for dizziness (occ). Negative for focal weakness and headaches.   All other systems reviewed and are negative.             Objective:     Constitutional:       Appearance: Well-developed.   Eyes:      General: No scleral icterus.     Conjunctiva/sclera: Conjunctivae normal.   HENT:      Head: Normocephalic and atraumatic.   Neck:      Vascular: No carotid bruit or JVD.   Pulmonary:      Effort: Pulmonary effort is normal.      Breath sounds: Normal breath sounds. No wheezing. No rales.   Cardiovascular:      Normal rate. Regular rhythm.      Murmurs: There is a systolic murmur.   Pulses:     Intact distal pulses.   Abdominal:      General: Bowel sounds are normal.      Palpations: Abdomen is soft.   Musculoskeletal:      Cervical back: Normal range of motion and neck supple. Skin:     General: Skin is warm and dry.      Findings: No rash.   Neurological:      Mental Status: Alert.       Procedures    Lab Review:         MDM  1.  Coronary disease  Patient has nonobstructive disease with normal function is currently stable on medical therapy  2.  Hypertension  Patient blood pressure currently stable on medications  3.  Hyperlipidemia  Patient lipid levels are followed by the primary care doctor.    Patient's previous medical records, labs, and EKG were reviewed and discussed with the patient at today's visit.

## 2022-05-05 ENCOUNTER — OFFICE VISIT (OUTPATIENT)
Dept: CARDIOLOGY | Facility: CLINIC | Age: 87
End: 2022-05-05

## 2022-05-05 VITALS
DIASTOLIC BLOOD PRESSURE: 84 MMHG | OXYGEN SATURATION: 96 % | HEART RATE: 77 BPM | WEIGHT: 174 LBS | SYSTOLIC BLOOD PRESSURE: 148 MMHG | HEIGHT: 65 IN | BODY MASS INDEX: 28.99 KG/M2

## 2022-05-05 DIAGNOSIS — E78.00 PURE HYPERCHOLESTEROLEMIA: ICD-10-CM

## 2022-05-05 DIAGNOSIS — I10 ESSENTIAL HYPERTENSION: ICD-10-CM

## 2022-05-05 DIAGNOSIS — I25.118 CORONARY ARTERY DISEASE OF NATIVE ARTERY OF NATIVE HEART WITH STABLE ANGINA PECTORIS: Primary | ICD-10-CM

## 2022-05-05 PROCEDURE — 99213 OFFICE O/P EST LOW 20 MIN: CPT | Performed by: INTERNAL MEDICINE

## 2022-05-05 RX ORDER — ATENOLOL 25 MG/1
25 TABLET ORAL DAILY
COMMUNITY

## 2022-05-05 RX ORDER — AMLODIPINE BESYLATE 5 MG/1
5 TABLET ORAL DAILY
COMMUNITY
End: 2022-05-05 | Stop reason: SDUPTHER

## 2022-05-05 RX ORDER — AMLODIPINE BESYLATE 5 MG/1
5 TABLET ORAL DAILY
Qty: 90 TABLET | Refills: 3 | Status: SHIPPED | OUTPATIENT
Start: 2022-05-05 | End: 2022-06-06 | Stop reason: SDUPTHER

## 2022-05-05 NOTE — PROGRESS NOTES
"    Subjective:     Encounter Date:05/05/2022      Patient ID: Lucy Macias is a 86 y.o. female.    Chief Complaint:  History of Present Illness 85-year-old white female with history of coronary disease history of hypertension hyperlipidemia presents to my office for follow-up.  Patient is currently stable without any signs of chest [place] some shortness of breath exertion.  Denies any PND orthopnea.  No palpitation dizziness any.  She has some swelling of the feet but she is taking medicine regularly she is quite active.  She does not smoke    The following portions of the patient's history were reviewed and updated as appropriate: allergies, current medications, past family history, past medical history, past social history, past surgical history and problem list.  Past Medical History:   Diagnosis Date   • Arthritis    • CKD (chronic kidney disease) stage 3, GFR 30-59 ml/min ([facility])    • Coronary artery disease    • GERD (gastroesophageal reflux disease)    • Hyperlipidemia    • Hypertension    • Hypothyroidism    • Rotator cuff tear, left    • Urinary tract infection    • Vertigo      Past Surgical History:   Procedure Laterality Date   • BREAST MASS EXCISION Right     Breast tumor R   • CARDIAC CATHETERIZATION     • CHOLECYSTECTOMY     • ENDOSCOPY     • HEMORRHOIDECTOMY     • SHOULDER ARTHROSCOPY W/ ROTATOR CUFF REPAIR Left    • SUBTOTAL HYSTERECTOMY     • TONSILLECTOMY       /84 (BP Location: Left arm, Patient Position: Sitting, Cuff Size: Adult)   Pulse 77   Ht 165.1 cm (65\")   Wt 78.9 kg (174 lb)   SpO2 96%   BMI 28.96 kg/m²   Family History   Problem Relation Age of Onset   • Heart disease Sister         MI   • Hypertension Other    • Other Other         Bypass Surgery        Current Outpatient Medications:   •  amLODIPine (NORVASC) 5 MG tablet, Take 1 tablet by mouth Daily., Disp: 90 tablet, Rfl: 3  •  aspirin 81 MG chewable tablet, Chew 81 mg Daily., Disp: , Rfl:   •  atenolol (TENORMIN) " 25 MG tablet, Take 25 mg by mouth Daily., Disp: , Rfl:   •  calcium carbonate-vitamin d 600-400 MG-UNIT per tablet, Take 1 tablet by mouth Daily., Disp: , Rfl:   •  Coenzyme Q10 (COQ-10) 200 MG capsule, Take 1 capsule by mouth Daily., Disp: , Rfl:   •  conjugated estrogens (PREMARIN) 0.625 MG/GM vaginal cream, Insert 1 g into the vagina 3 (Three) Times a Week. Mon,Wed,Fri, Disp: , Rfl:   •  famotidine (PEPCID) 40 MG tablet, , Disp: , Rfl:   •  levothyroxine (SYNTHROID, LEVOTHROID) 75 MCG tablet, , Disp: , Rfl:   •  Mirabegron ER (MYRBETRIQ) 50 MG tablet sustained-release 24 hour 24 hr tablet, Take 50 mg by mouth Daily. Every other day, Disp: , Rfl:   •  Multiple Vitamins-Minerals (MULTIVITAMIN WITH MINERALS) tablet tablet, Take 1 tablet by mouth Daily., Disp: , Rfl:   •  mupirocin (BACTROBAN) 2 % ointment, Apply  topically to the appropriate area as directed 3 (Three) Times a Day., Disp: 22 g, Rfl: 0  •  Probiotic Product (PROBIOTIC-10) capsule, Take  by mouth., Disp: , Rfl:   •  simvastatin (ZOCOR) 20 MG tablet, Take 20 mg by mouth Every Night., Disp: , Rfl:   •  Tacrolimus (PROGRAF PO), Take 10 mL by mouth 4 (Four) Times a Day. Tacrolimus Liquid 1mg/1,000mL  Swish and Spit 10mL QID (pt usually uses BID-TID) Verified per [facility] pharmacy where med is compounded. Instructed family to bring med., Disp: , Rfl:   Allergies   Allergen Reactions   • Clindamycin Hcl Itching   • Tetracycline Itching   • Bactrim [Sulfamethoxazole-Trimethoprim] Other (See Comments)     Unknown      Social History     Socioeconomic History   • Marital status:    Tobacco Use   • Smoking status: Never Smoker   • Smokeless tobacco: Never Used   Substance and Sexual Activity   • Alcohol use: No   • Drug use: No   • Sexual activity: Defer     Review of Systems   Constitutional: Positive for malaise/fatigue. Negative for fever.   Cardiovascular: Positive for leg swelling. Negative for chest pain, dyspnea on exertion and palpitations.    Respiratory: Positive for shortness of breath. Negative for cough.    Skin: Negative for rash.   Gastrointestinal: Negative for abdominal pain, nausea and vomiting.   Neurological: Negative for focal weakness and headaches.   All other systems reviewed and are negative.             Objective:     Constitutional:       Appearance: Well-developed.   Eyes:      General: No scleral icterus.     Conjunctiva/sclera: Conjunctivae normal.   HENT:      Head: Normocephalic and atraumatic.   Neck:      Vascular: No carotid bruit or JVD.   Pulmonary:      Effort: Pulmonary effort is normal.      Breath sounds: Normal breath sounds. No wheezing. No rales.   Cardiovascular:      Normal rate. Regular rhythm.   Pulses:     Intact distal pulses.   Abdominal:      General: Bowel sounds are normal.      Palpations: Abdomen is soft.   Musculoskeletal:      Cervical back: Normal range of motion and neck supple. Skin:     General: Skin is warm and dry.      Findings: No rash.   Neurological:      Mental Status: Alert.       Procedures    Lab Review:         MDM  1 coronary disease  Patient has nonobstructive disease and is currently stable on medications  2.  Hypertension  Patient blood pressure currently stable on atenolol and amlodipine  3.  Hyperlipidemia  Patient is currently on statins and the lipid levels are well within normal limits      Patient's previous medical records, labs, and EKG were reviewed and discussed with the patient at today's visit.

## 2022-06-06 ENCOUNTER — TELEPHONE (OUTPATIENT)
Dept: CARDIOLOGY | Facility: CLINIC | Age: 87
End: 2022-06-06

## 2022-06-06 RX ORDER — AMLODIPINE BESYLATE 5 MG/1
5 TABLET ORAL DAILY
Qty: 90 TABLET | Refills: 3 | Status: SHIPPED | OUTPATIENT
Start: 2022-06-06

## 2022-06-06 NOTE — TELEPHONE ENCOUNTER
Rx Refill Note  Requested Prescriptions     Pending Prescriptions Disp Refills   • amLODIPine (NORVASC) 5 MG tablet 90 tablet 3     Sig: Take 1 tablet by mouth Daily.      Last office visit with prescribing clinician: 5/5/2022      Next office visit with prescribing clinician: 1/5/2023            Swetha Johnson MA  06/06/22, 12:44 EDT

## 2022-06-06 NOTE — TELEPHONE ENCOUNTER
Incoming Refill Request      Medication requested (name and dose): AMLODIPINE 5 MG    Pharmacy where request should be sent: [facility]    Additional details provided by patient:     Best call back number: [phone]    Does the patient have less than a 3 day supply:  [] Yes  [x] No    Laurie Muse Rep  06/06/22, 12:33 EDT

## 2023-01-05 ENCOUNTER — OFFICE VISIT (OUTPATIENT)
Dept: CARDIOLOGY | Facility: CLINIC | Age: 88
End: 2023-01-05
Payer: MEDICARE

## 2023-01-05 VITALS — WEIGHT: 172 LBS | HEART RATE: 81 BPM | HEIGHT: 65 IN | OXYGEN SATURATION: 97 % | BODY MASS INDEX: 28.66 KG/M2

## 2023-01-05 DIAGNOSIS — E78.00 PURE HYPERCHOLESTEROLEMIA: ICD-10-CM

## 2023-01-05 DIAGNOSIS — I10 ESSENTIAL HYPERTENSION: ICD-10-CM

## 2023-01-05 DIAGNOSIS — I25.118 CORONARY ARTERY DISEASE OF NATIVE ARTERY OF NATIVE HEART WITH STABLE ANGINA PECTORIS: Primary | ICD-10-CM

## 2023-01-05 PROCEDURE — 99213 OFFICE O/P EST LOW 20 MIN: CPT | Performed by: INTERNAL MEDICINE

## 2023-01-05 NOTE — PROGRESS NOTES
Subjective:     Encounter Date:01/05/2023      Patient ID: Lucy Macias is a 87 y.o. female.    Chief Complaint:  History of Present Illness 87-year-old white female with history of coronary disease hypertension hyperlipidemia presents to my office for follow-up.  Patient is currently stable without any symptoms of chest pain at rest on exertion.  She has occasional shortness of breath with exertion.  No complaints of any PND orthopnea.  No palpitation but has occasional dizziness in the morning.  No syncope.  She has some mild swelling of the feet but she is taking her medicines regularly.  The following portions of the patient's history were reviewed and updated as appropriate: allergies, current medications, past family history, past medical history, past social history, past surgical history and problem list.  Past Medical History:   Diagnosis Date   • Arthritis    • CKD (chronic kidney disease) stage 3, GFR 30-59 ml/min ([facility])    • Coronary artery disease    • GERD (gastroesophageal reflux disease)    • Hyperlipidemia    • Hypertension    • Hypothyroidism    • Rotator cuff tear, left    • Urinary tract infection    • Vertigo      Past Surgical History:   Procedure Laterality Date   • BREAST MASS EXCISION Right     Breast tumor R   • CARDIAC CATHETERIZATION     • CHOLECYSTECTOMY     • ENDOSCOPY     • HEMORRHOIDECTOMY     • SHOULDER ARTHROSCOPY W/ ROTATOR CUFF REPAIR Left    • SUBTOTAL HYSTERECTOMY     • TONSILLECTOMY       Pulse 81   Ht 165.1 cm (65\")   Wt 78 kg (172 lb)   SpO2 97%   BMI 28.62 kg/m²   Family History   Problem Relation Age of Onset   • Heart disease Sister         MI   • Hypertension Other    • Other Other         Bypass Surgery        Current Outpatient Medications:   •  amLODIPine (NORVASC) 5 MG tablet, Take 1 tablet by mouth Daily., Disp: 90 tablet, Rfl: 3  •  aspirin 81 MG chewable tablet, Chew 81 mg Daily., Disp: , Rfl:   •  atenolol (TENORMIN) 25 MG tablet, Take 25 mg by  mouth Daily., Disp: , Rfl:   •  calcium carbonate-vitamin d 600-400 MG-UNIT per tablet, Take 1 tablet by mouth Daily., Disp: , Rfl:   •  Coenzyme Q10 (COQ-10) 200 MG capsule, Take 1 capsule by mouth Daily., Disp: , Rfl:   •  conjugated estrogens (PREMARIN) 0.625 MG/GM vaginal cream, Insert 1 g into the vagina 3 (Three) Times a Week. Mon,Wed,Fri, Disp: , Rfl:   •  famotidine (PEPCID) 40 MG tablet, , Disp: , Rfl:   •  fluticasone (FLONASE) 50 MCG/ACT nasal spray, 1 spray to each nostril once daily, Disp: 16 g, Rfl: 0  •  levothyroxine (SYNTHROID, LEVOTHROID) 75 MCG tablet, , Disp: , Rfl:   •  Mirabegron ER (MYRBETRIQ) 50 MG tablet sustained-release 24 hour 24 hr tablet, Take 50 mg by mouth Daily. Every other day, Disp: , Rfl:   •  Multiple Vitamins-Minerals (MULTIVITAMIN WITH MINERALS) tablet tablet, Take 1 tablet by mouth Daily., Disp: , Rfl:   •  mupirocin (BACTROBAN) 2 % ointment, Apply  topically to the appropriate area as directed 3 (Three) Times a Day., Disp: 22 g, Rfl: 0  •  Probiotic Product (PROBIOTIC-10) capsule, Take  by mouth., Disp: , Rfl:   •  simvastatin (ZOCOR) 20 MG tablet, Take 20 mg by mouth Every Night., Disp: , Rfl:   •  Tacrolimus (PROGRAF PO), Take 10 mL by mouth 4 (Four) Times a Day. Tacrolimus Liquid 1mg/1,000mL  Swish and Spit 10mL QID (pt usually uses BID-TID) Verified per [facility] pharmacy where med is compounded. Instructed family to bring med., Disp: , Rfl:   Allergies   Allergen Reactions   • Clindamycin Hcl Itching   • Tetracycline Itching   • Bactrim [Sulfamethoxazole-Trimethoprim] Other (See Comments)     Unknown      Social History     Socioeconomic History   • Marital status:    Tobacco Use   • Smoking status: Never   • Smokeless tobacco: Never   Substance and Sexual Activity   • Alcohol use: No   • Drug use: No   • Sexual activity: Not Currently     Review of Systems   Constitutional: Positive for malaise/fatigue.   Cardiovascular: Positive for leg swelling. Negative for  chest pain, dyspnea on exertion and palpitations.   Respiratory: Positive for shortness of breath. Negative for cough.    Gastrointestinal: Negative for abdominal pain, nausea and vomiting.   Neurological: Positive for light-headedness. Negative for dizziness, focal weakness, headaches and numbness.   All other systems reviewed and are negative.             Objective:     Constitutional:       Appearance: Well-developed.   Eyes:      General: No scleral icterus.     Conjunctiva/sclera: Conjunctivae normal.   HENT:      Head: Normocephalic and atraumatic.   Neck:      Vascular: No carotid bruit or JVD.   Pulmonary:      Effort: Pulmonary effort is normal.      Breath sounds: Normal breath sounds. No wheezing. No rales.   Cardiovascular:      Normal rate. Regular rhythm.   Pulses:     Intact distal pulses.   Abdominal:      General: Bowel sounds are normal.      Palpations: Abdomen is soft.   Musculoskeletal:      Cervical back: Normal range of motion and neck supple. Skin:     General: Skin is warm and dry.      Findings: No rash.   Neurological:      Mental Status: Alert.       Procedures    Lab Review:         MDM  1.  Coronary disease  Patient has nonobstructive disease with normal V function is currently stable on medications  2.  Hypertension  Patient blood pressure is currently stable at home but he had slightly high which is on amlodipine and atenolol  3.  Hyperlipidemia  Patient is on simvastatin the lipid levels are well within normal limits      Patient's previous medical records, labs, and EKG were reviewed and discussed with the patient at today's visit.

## 2023-05-01 RX ORDER — AMLODIPINE BESYLATE 5 MG/1
5 TABLET ORAL DAILY
Qty: 90 TABLET | Refills: 3 | Status: SHIPPED | OUTPATIENT
Start: 2023-05-01

## 2023-05-01 NOTE — TELEPHONE ENCOUNTER
Rx Refill Note  Requested Prescriptions     Pending Prescriptions Disp Refills   • amLODIPine (NORVASC) 5 MG tablet [Pharmacy Med Name: amLODIPine Besylate 5 MG Oral Tablet] 90 tablet 3     Sig: TAKE 1 TABLET BY MOUTH  DAILY      Last office visit with prescribing clinician: 1/5/2023   Last telemedicine visit with prescribing clinician: 7/18/2023   Next office visit with prescribing clinician: 7/18/2023                         Would you like a call back once the refill request has been completed: [] Yes [] No    If the office needs to give you a call back, can they leave a voicemail: [] Yes [] No    Swetha Johnson MA  05/01/23, 08:05 EDT

## 2023-06-19 ENCOUNTER — OFFICE (OUTPATIENT)
Dept: URBAN - METROPOLITAN AREA CLINIC 64 | Facility: CLINIC | Age: 88
End: 2023-06-19

## 2023-06-19 VITALS
WEIGHT: 167 LBS | SYSTOLIC BLOOD PRESSURE: 159 MMHG | HEART RATE: 81 BPM | HEIGHT: 65 IN | DIASTOLIC BLOOD PRESSURE: 106 MMHG

## 2023-06-19 DIAGNOSIS — R15.0 INCOMPLETE DEFECATION: ICD-10-CM

## 2023-06-19 DIAGNOSIS — K59.00 CONSTIPATION, UNSPECIFIED: ICD-10-CM

## 2023-06-19 DIAGNOSIS — R13.10 DYSPHAGIA, UNSPECIFIED: ICD-10-CM

## 2023-06-19 PROCEDURE — 99203 OFFICE O/P NEW LOW 30 MIN: CPT | Performed by: INTERNAL MEDICINE

## 2023-06-19 RX ORDER — POLYETHYLENE GLYCOL 3350 17 G/17G
17 POWDER, FOR SOLUTION ORAL
Qty: 1 | Refills: 11 | Status: ACTIVE
Start: 2023-06-19

## 2023-08-01 ENCOUNTER — TELEPHONE (OUTPATIENT)
Dept: CARDIOLOGY | Facility: CLINIC | Age: 88
End: 2023-08-01
Payer: MEDICARE

## 2023-08-07 ENCOUNTER — TELEPHONE (OUTPATIENT)
Dept: CARDIOLOGY | Facility: CLINIC | Age: 88
End: 2023-08-07

## 2023-08-07 NOTE — TELEPHONE ENCOUNTER
"  Caller: Lucy Macias \"Pat\"    Relationship: Self    Best call back number: [phone]    What is the best time to reach you: ANYTIME        What was the call regarding: PATIENT STATES THE SWELLING HAS WENT DOWN AFTER 2 DAYS AND EVEN FEEL LIKE SHE LOSS A POUND OR TWO. BLOOD PRESSURE HAS WENT DOWN 127/75 AFTER TAKING THE SECOND PILL, ON THIRD DAY WENT /80, AFTER NO MEDICATION 147/74. PLEASE REACH OUT TO PATIENT FOR FURTHER DISCUSSION.    Is it okay if the provider responds through MyChart: NO, PREFER PHONE CALL    "

## 2023-08-16 ENCOUNTER — TELEPHONE (OUTPATIENT)
Dept: CARDIOLOGY | Facility: CLINIC | Age: 88
End: 2023-08-16
Payer: MEDICARE

## 2023-08-16 NOTE — TELEPHONE ENCOUNTER
"  Caller: Lucy Macias \"Pat\"    Relationship: Self    Best call back number: [phone]    READ AT 9AM  PATIENT IS ONLY TAKING ATENOLOL AT NIGHT      8.10 - 132/81  8.11 - 135/85  8.12 - 137/83  8.13 - 138/82  8.14 - 133/52  8.15 - 145/81  8.16 - 147/88  "

## 2024-01-23 ENCOUNTER — OFFICE VISIT (OUTPATIENT)
Dept: CARDIOLOGY | Facility: CLINIC | Age: 89
End: 2024-01-23
Payer: MEDICARE

## 2024-01-23 VITALS
DIASTOLIC BLOOD PRESSURE: 78 MMHG | SYSTOLIC BLOOD PRESSURE: 137 MMHG | WEIGHT: 161 LBS | HEIGHT: 65 IN | HEART RATE: 79 BPM | BODY MASS INDEX: 26.82 KG/M2 | OXYGEN SATURATION: 98 %

## 2024-01-23 DIAGNOSIS — I25.118 CORONARY ARTERY DISEASE OF NATIVE ARTERY OF NATIVE HEART WITH STABLE ANGINA PECTORIS: Primary | ICD-10-CM

## 2024-01-23 DIAGNOSIS — E78.00 PURE HYPERCHOLESTEROLEMIA: ICD-10-CM

## 2024-01-23 DIAGNOSIS — I10 ESSENTIAL HYPERTENSION: ICD-10-CM

## 2024-01-23 PROCEDURE — 1159F MED LIST DOCD IN RCRD: CPT | Performed by: INTERNAL MEDICINE

## 2024-01-23 PROCEDURE — 1160F RVW MEDS BY RX/DR IN RCRD: CPT | Performed by: INTERNAL MEDICINE

## 2024-01-23 PROCEDURE — 99213 OFFICE O/P EST LOW 20 MIN: CPT | Performed by: INTERNAL MEDICINE

## 2024-01-23 NOTE — PROGRESS NOTES
"    Subjective:     Encounter Date:01/23/2024      Patient ID: Lucy Macias is a 88 y.o. female.    Chief Complaint:  Coronary Artery Disease  Pertinent negatives include no chest pain, dizziness, leg swelling, palpitations or shortness of breath.   88-year-old white female with history of coronary disease hypertension hyperlipidemia presents to my office for a follow-up.  Patient is currently stable without any symptoms of chest pain or shortness of breath at rest or exertion radiograms any PND orthopnea.  No palpitation.  Occasional dizziness.  No syncope or swelling of the feet.  Patient is taking all her medicines regular.  Patient does not smoke.    The following portions of the patient's history were reviewed and updated as appropriate: allergies, current medications, past family history, past medical history, past social history, past surgical history, and problem list.  Past Medical History:   Diagnosis Date    Arthritis     CKD (chronic kidney disease) stage 3, GFR 30-59 ml/min     Coronary artery disease     GERD (gastroesophageal reflux disease)     Hyperlipidemia     Hypertension     Hypothyroidism     Rotator cuff tear, left     Urinary tract infection     Vertigo      Past Surgical History:   Procedure Laterality Date    BREAST MASS EXCISION Right     Breast tumor R    CARDIAC CATHETERIZATION      CHOLECYSTECTOMY      ENDOSCOPY      HEMORRHOIDECTOMY      SHOULDER ARTHROSCOPY W/ ROTATOR CUFF REPAIR Left     SUBTOTAL HYSTERECTOMY      TONSILLECTOMY       /78   Pulse 79   Ht 165.1 cm (65\")   Wt 73 kg (161 lb)   SpO2 98%   BMI 26.79 kg/m²   Family History   Problem Relation Age of Onset    No Known Problems Mother     No Known Problems Father     Heart disease Sister         MI    Hypertension Other     Other Other         Bypass Surgery        Current Outpatient Medications:     amLODIPine (NORVASC) 5 MG tablet, TAKE 1 TABLET BY MOUTH  DAILY, Disp: 90 tablet, Rfl: 3    aspirin 81 MG " chewable tablet, Chew 1 tablet Daily., Disp: , Rfl:     atenolol (TENORMIN) 25 MG tablet, Take 1 tablet by mouth Daily., Disp: , Rfl:     azithromycin (Zithromax Z-Felipe) 250 MG tablet, Take 2 tablets the first day, then 1 tablet daily for 4 days., Disp: 6 tablet, Rfl: 0    calcium carbonate-vitamin d 600-400 MG-UNIT per tablet, Take 1 tablet by mouth Daily., Disp: , Rfl:     Coenzyme Q10 (COQ-10) 200 MG capsule, Take 1 capsule by mouth Daily., Disp: , Rfl:     conjugated estrogens (PREMARIN) 0.625 MG/GM vaginal cream, Insert 1 g into the vagina 3 (Three) Times a Week. Mon,Wed,Fri, Disp: , Rfl:     famotidine (PEPCID) 40 MG tablet, , Disp: , Rfl:     fluticasone (FLONASE) 50 MCG/ACT nasal spray, 1 spray to each nostril once daily, Disp: 16 g, Rfl: 0    levothyroxine (SYNTHROID, LEVOTHROID) 75 MCG tablet, , Disp: , Rfl:     Mirabegron ER (MYRBETRIQ) 50 MG tablet sustained-release 24 hour 24 hr tablet, Take 50 mg by mouth Daily. Every other day, Disp: , Rfl:     Multiple Vitamins-Minerals (MULTIVITAMIN WITH MINERALS) tablet tablet, Take 1 tablet by mouth Daily., Disp: , Rfl:     mupirocin (BACTROBAN) 2 % ointment, Apply  topically to the appropriate area as directed 3 (Three) Times a Day., Disp: 22 g, Rfl: 0    simvastatin (ZOCOR) 20 MG tablet, Take 1 tablet by mouth Every Night., Disp: , Rfl:     Tacrolimus (PROGRAF PO), Take 10 mL by mouth 4 (Four) Times a Day. Tacrolimus Liquid 1mg/1,000mL  Swish and Spit 10mL QID (pt usually uses BID-TID) Verified per [facility] pharmacy where med is compounded. Instructed family to bring med., Disp: , Rfl:   Allergies   Allergen Reactions    Clindamycin Hcl Itching    Tetracycline Itching    Bactrim [Sulfamethoxazole-Trimethoprim] Other (See Comments)     Unknown      Social History     Socioeconomic History    Marital status:    Tobacco Use    Smoking status: Never     Passive exposure: Never    Smokeless tobacco: Never   Vaping Use    Vaping Use: Never used   Substance and  Sexual Activity    Alcohol use: No    Drug use: No    Sexual activity: Not Currently     Review of Systems   Constitutional: Positive for malaise/fatigue.   Cardiovascular:  Negative for chest pain, dyspnea on exertion, leg swelling and palpitations.   Respiratory:  Negative for cough and shortness of breath.    Gastrointestinal:  Negative for abdominal pain, nausea and vomiting.   Neurological:  Positive for light-headedness and numbness. Negative for dizziness, focal weakness and headaches.   All other systems reviewed and are negative.             Objective:     Constitutional:       Appearance: Well-developed.   Eyes:      General: No scleral icterus.     Conjunctiva/sclera: Conjunctivae normal.   HENT:      Head: Normocephalic and atraumatic.   Neck:      Vascular: No carotid bruit or JVD.   Pulmonary:      Effort: Pulmonary effort is normal.      Breath sounds: Normal breath sounds. No wheezing. No rales.   Cardiovascular:      Normal rate. Regular rhythm.   Pulses:     Intact distal pulses.   Abdominal:      General: Bowel sounds are normal.      Palpations: Abdomen is soft.   Musculoskeletal:      Cervical back: Normal range of motion and neck supple. Skin:     General: Skin is warm and dry.      Findings: No rash.   Neurological:      Mental Status: Alert.       Procedures    Lab Review:         MDM  #1 coronary disease  Patient has nonapproved disease in the past and is currently stable on medications with normal V function and does not have any angina  2.  Hyperlipidemia  Patient is on simvastatin the lipid levels are well within normal limits  3.  Hypertension  Patient blood pressure currently stable on atenolol and amlodipine.      Patient's previous medical records, labs, and EKG were reviewed and discussed with the patient at today's visit.

## 2024-07-30 ENCOUNTER — OFFICE VISIT (OUTPATIENT)
Dept: CARDIOLOGY | Facility: CLINIC | Age: 89
End: 2024-07-30
Payer: MEDICARE

## 2024-07-30 VITALS
OXYGEN SATURATION: 97 % | HEIGHT: 65 IN | BODY MASS INDEX: 27.32 KG/M2 | HEART RATE: 77 BPM | DIASTOLIC BLOOD PRESSURE: 79 MMHG | SYSTOLIC BLOOD PRESSURE: 140 MMHG | WEIGHT: 164 LBS

## 2024-07-30 DIAGNOSIS — E78.00 PURE HYPERCHOLESTEROLEMIA: ICD-10-CM

## 2024-07-30 DIAGNOSIS — I25.118 CORONARY ARTERY DISEASE OF NATIVE ARTERY OF NATIVE HEART WITH STABLE ANGINA PECTORIS: Primary | ICD-10-CM

## 2024-07-30 DIAGNOSIS — I10 ESSENTIAL HYPERTENSION: ICD-10-CM

## 2024-07-30 PROCEDURE — 1159F MED LIST DOCD IN RCRD: CPT | Performed by: INTERNAL MEDICINE

## 2024-07-30 PROCEDURE — 99214 OFFICE O/P EST MOD 30 MIN: CPT | Performed by: INTERNAL MEDICINE

## 2024-07-30 PROCEDURE — 1160F RVW MEDS BY RX/DR IN RCRD: CPT | Performed by: INTERNAL MEDICINE

## 2024-07-30 NOTE — PROGRESS NOTES
"    Subjective:     Encounter Date:07/30/2024      Patient ID: Lucy Macias is a 88 y.o. female.    Chief Complaint:  History of Present Illness 88-year-old white female with history of coronary disease hypertension hyperlipidemia and chronic renal insufficiency presents to the office for a follow-up.  Patient is currently stable without any symptoms of chest pain or shortness of breath at rest or exertion.  No complaint of any PND or orthopnea.  No palpitations dizziness syncope or she has some swelling of the feet.  She is taking her medicines regularly.  She does not smoke.    The following portions of the patient's history were reviewed and updated as appropriate: allergies, current medications, past family history, past medical history, past social history, past surgical history, and problem list.  Past Medical History:   Diagnosis Date    Arthritis     CKD (chronic kidney disease) stage 3, GFR 30-59 ml/min     Coronary artery disease     GERD (gastroesophageal reflux disease)     Hyperlipidemia     Hypertension     Hypothyroidism     Rotator cuff tear, left     Urinary tract infection     Vertigo      Past Surgical History:   Procedure Laterality Date    BREAST MASS EXCISION Right     Breast tumor R    CARDIAC CATHETERIZATION      CHOLECYSTECTOMY      ENDOSCOPY      HEMORRHOIDECTOMY      SHOULDER ARTHROSCOPY W/ ROTATOR CUFF REPAIR Left     SUBTOTAL HYSTERECTOMY      TONSILLECTOMY       /79   Pulse 77   Ht 165.1 cm (65\")   Wt 74.4 kg (164 lb)   SpO2 97%   BMI 27.29 kg/m²   Family History   Problem Relation Age of Onset    No Known Problems Mother     No Known Problems Father     Heart disease Sister         MI    Hypertension Other     Other Other         Bypass Surgery        Current Outpatient Medications:     amLODIPine (NORVASC) 5 MG tablet, TAKE 1 TABLET BY MOUTH  DAILY, Disp: 90 tablet, Rfl: 3    aspirin 81 MG chewable tablet, Chew 1 tablet Daily., Disp: , Rfl:     atenolol (TENORMIN) " 25 MG tablet, Take 1 tablet by mouth Daily., Disp: , Rfl:     calcium carbonate-vitamin d 600-400 MG-UNIT per tablet, Take 1 tablet by mouth Daily., Disp: , Rfl:     Coenzyme Q10 (COQ-10) 200 MG capsule, Take 1 capsule by mouth Daily., Disp: , Rfl:     conjugated estrogens (PREMARIN) 0.625 MG/GM vaginal cream, Insert 1 g into the vagina 3 (Three) Times a Week. Mon,Wed,Fri, Disp: , Rfl:     famotidine (PEPCID) 40 MG tablet, , Disp: , Rfl:     fluticasone (FLONASE) 50 MCG/ACT nasal spray, 1 spray to each nostril once daily, Disp: 16 g, Rfl: 0    levothyroxine (SYNTHROID, LEVOTHROID) 75 MCG tablet, , Disp: , Rfl:     Mirabegron ER (MYRBETRIQ) 50 MG tablet sustained-release 24 hour 24 hr tablet, Take 50 mg by mouth Daily. Every other day, Disp: , Rfl:     Multiple Vitamins-Minerals (MULTIVITAMIN WITH MINERALS) tablet tablet, Take 1 tablet by mouth Daily., Disp: , Rfl:     mupirocin (BACTROBAN) 2 % ointment, Apply  topically to the appropriate area as directed 3 (Three) Times a Day., Disp: 22 g, Rfl: 0    simvastatin (ZOCOR) 20 MG tablet, Take 1 tablet by mouth Every Night., Disp: , Rfl:     Tacrolimus (PROGRAF PO), Take 10 mL by mouth 4 (Four) Times a Day. Tacrolimus Liquid 1mg/1,000mL  Swish and Spit 10mL QID (pt usually uses BID-TID) Verified per [facility] pharmacy where med is compounded. Instructed family to bring med., Disp: , Rfl:     azithromycin (Zithromax Z-Felipe) 250 MG tablet, Take 2 tablets the first day, then 1 tablet daily for 4 days. (Patient not taking: Reported on 7/30/2024), Disp: 6 tablet, Rfl: 0  Allergies   Allergen Reactions    Clindamycin Hcl Itching    Tetracycline Itching    Bactrim [Sulfamethoxazole-Trimethoprim] Other (See Comments)     Unknown      Social History     Socioeconomic History    Marital status:    Tobacco Use    Smoking status: Never     Passive exposure: Never    Smokeless tobacco: Never   Vaping Use    Vaping status: Never Used   Substance and Sexual Activity    Alcohol  use: No    Drug use: No    Sexual activity: Not Currently     Review of Systems   Constitutional: Positive for malaise/fatigue.   Cardiovascular:  Positive for leg swelling. Negative for chest pain, dyspnea on exertion and palpitations.   Respiratory:  Negative for cough and shortness of breath.    Gastrointestinal:  Negative for abdominal pain, nausea and vomiting.   Neurological:  Negative for dizziness, focal weakness, headaches, light-headedness and numbness.   All other systems reviewed and are negative.             Objective:     Constitutional:       Appearance: Well-developed.   Eyes:      General: No scleral icterus.     Conjunctiva/sclera: Conjunctivae normal.   HENT:      Head: Normocephalic and atraumatic.   Neck:      Vascular: No carotid bruit or JVD.   Pulmonary:      Effort: Pulmonary effort is normal.      Breath sounds: Normal breath sounds. No wheezing. No rales.   Cardiovascular:      Normal rate. Regular rhythm.   Pulses:     Intact distal pulses.   Abdominal:      General: Bowel sounds are normal.      Palpations: Abdomen is soft.   Musculoskeletal:      Cervical back: Normal range of motion and neck supple. Skin:     General: Skin is warm and dry.      Findings: No rash.   Neurological:      Mental Status: Alert.       Procedures    Lab Review:         MDM    #1 coronary disease  Patient has nonobstructive disease in the past and is currently stable on medications with normal LV function  2.  Hypertension  Patient blood pressure stable at home but she has slightly high blood pressure here which could be whitecoat hypertension and she is already on medicines including atenolol and amlodipine  3.  Hyperlipidemia  Patient is on simvastatin and the lipid levels are well within normal limits    Patient's previous medical records, labs, and EKG were reviewed and discussed with the patient at today's visit.

## 2024-08-08 ENCOUNTER — HOSPITAL ENCOUNTER (OUTPATIENT)
Facility: HOSPITAL | Age: 89
Discharge: HOME | End: 2024-08-08
Attending: EMERGENCY MEDICINE | Admitting: EMERGENCY MEDICINE
Payer: MEDICARE

## 2024-08-08 VITALS
BODY MASS INDEX: 26.82 KG/M2 | HEART RATE: 86 BPM | DIASTOLIC BLOOD PRESSURE: 71 MMHG | RESPIRATION RATE: 16 BRPM | OXYGEN SATURATION: 95 % | HEIGHT: 65 IN | TEMPERATURE: 98.1 F | SYSTOLIC BLOOD PRESSURE: 152 MMHG | WEIGHT: 161 LBS

## 2024-08-08 DIAGNOSIS — S80.02XA: Primary | ICD-10-CM

## 2024-08-08 PROCEDURE — 99213 OFFICE O/P EST LOW 20 MIN: CPT

## 2024-08-08 PROCEDURE — G0463 HOSPITAL OUTPT CLINIC VISIT: HCPCS

## 2024-08-08 NOTE — DISCHARGE INSTRUCTIONS
Take Tylenol as needed for pain    Apply heat to the back of your left leg to help facilitate circulation which will help improve and resolve bruise.    As of this visit there is no evidence of DVT as she do not have any swelling of the left lower extremity any redness or warmth on exam.    The soft tissue bruising should resolve within the next 2 to 3 weeks.    Any change in condition or worsening signs and symptoms return to ER    Follow up with family doctor need to return to ER for worsening symptoms

## 2025-04-14 PROBLEM — U07.1 COVID-19: Status: ACTIVE | Noted: 2025-04-14

## 2025-04-14 PROBLEM — R05.1 ACUTE COUGH: Status: ACTIVE | Noted: 2025-04-14

## 2025-07-30 ENCOUNTER — OFFICE VISIT (OUTPATIENT)
Dept: CARDIOLOGY | Facility: CLINIC | Age: 89
End: 2025-07-30
Payer: MEDICARE

## 2025-07-30 VITALS
SYSTOLIC BLOOD PRESSURE: 154 MMHG | DIASTOLIC BLOOD PRESSURE: 79 MMHG | BODY MASS INDEX: 27.49 KG/M2 | HEIGHT: 65 IN | OXYGEN SATURATION: 98 % | WEIGHT: 165 LBS | HEART RATE: 68 BPM

## 2025-07-30 DIAGNOSIS — I25.118 CORONARY ARTERY DISEASE OF NATIVE ARTERY OF NATIVE HEART WITH STABLE ANGINA PECTORIS: Primary | ICD-10-CM

## 2025-07-30 DIAGNOSIS — E78.00 PURE HYPERCHOLESTEROLEMIA: ICD-10-CM

## 2025-07-30 DIAGNOSIS — I10 ESSENTIAL HYPERTENSION: ICD-10-CM

## 2025-07-30 PROCEDURE — 99214 OFFICE O/P EST MOD 30 MIN: CPT | Performed by: INTERNAL MEDICINE

## 2025-07-30 PROCEDURE — 1159F MED LIST DOCD IN RCRD: CPT | Performed by: INTERNAL MEDICINE

## 2025-07-30 PROCEDURE — 1160F RVW MEDS BY RX/DR IN RCRD: CPT | Performed by: INTERNAL MEDICINE

## 2025-07-30 RX ORDER — CYCLOSPORINE OPHTHALMIC SOLUTION 1 MG/ML
SOLUTION/ DROPS OPHTHALMIC
COMMUNITY
Start: 2025-06-20

## 2025-07-30 NOTE — PROGRESS NOTES
"    Subjective:     Encounter Date:07/30/2025      Patient ID: Lucy Macias is a 89 y.o. female.    Chief Complaint:  History of Present Illness 89year-old white female with history of coronary disease hypertension hyperlipidemia presents to the office for a follow-up.  Patient is currently stable without any symptoms of chest pain but has shortness of breath with exertion.  No compressively PND orthopnea.  No palpitations dizziness syncope she has occasional swelling of the feet.  She is taking her medicines regular.  She does not smoke.    The following portions of the patient's history were reviewed and updated as appropriate: allergies, current medications, past family history, past medical history, past social history, past surgical history, and problem list.  Past Medical History:   Diagnosis Date    Arthritis     CKD (chronic kidney disease) stage 3, GFR 30-59 ml/min     Coronary artery disease     GERD (gastroesophageal reflux disease)     Hyperlipidemia     Hypertension     Hypothyroidism     Rotator cuff tear, left     Urinary tract infection     Vertigo      Past Surgical History:   Procedure Laterality Date    BREAST MASS EXCISION Right     Breast tumor R    CARDIAC CATHETERIZATION      CHOLECYSTECTOMY      ENDOSCOPY      HEMORRHOIDECTOMY      SHOULDER ARTHROSCOPY W/ ROTATOR CUFF REPAIR Left     SUBTOTAL HYSTERECTOMY      TONSILLECTOMY       /79 (BP Location: Right arm, Patient Position: Sitting, Cuff Size: Adult)   Pulse 68   Ht 165.1 cm (65\")   Wt 74.8 kg (165 lb)   SpO2 98%   BMI 27.46 kg/m²   Family History   Problem Relation Age of Onset    No Known Problems Mother     No Known Problems Father     Heart disease Sister         MI    No Known Problems Brother     No Known Problems Maternal Aunt     No Known Problems Maternal Uncle     No Known Problems Paternal Aunt     No Known Problems Paternal Uncle     No Known Problems Maternal Grandmother     No Known Problems Maternal " Grandfather     No Known Problems Paternal Grandmother     No Known Problems Paternal Grandfather     Hypertension Other     Other Other         Bypass Surgery     Anemia Neg Hx     Arrhythmia Neg Hx     Asthma Neg Hx     Clotting disorder Neg Hx     Fainting Neg Hx     Heart attack Neg Hx     Heart failure Neg Hx     Hyperlipidemia Neg Hx        Current Outpatient Medications:     amLODIPine (NORVASC) 5 MG tablet, TAKE 1 TABLET BY MOUTH  DAILY, Disp: 90 tablet, Rfl: 3    aspirin 81 MG chewable tablet, Chew 1 tablet Daily., Disp: , Rfl:     atenolol (TENORMIN) 25 MG tablet, Take 1 tablet by mouth Daily., Disp: , Rfl:     calcium carbonate-vitamin d 600-400 MG-UNIT per tablet, Take 1 tablet by mouth Daily., Disp: , Rfl:     Coenzyme Q10 (COQ-10) 200 MG capsule, Take 1 capsule by mouth Daily., Disp: , Rfl:     conjugated estrogens (PREMARIN) 0.625 MG/GM vaginal cream, Insert 1 g into the vagina 3 (Three) Times a Week. Mon,Wed,Fri, Disp: , Rfl:     cycloSPORINE (Vevye) 0.1 % solution, 1 drop  BID OU, Disp: , Rfl:     famotidine (PEPCID) 40 MG tablet, Take 1 tablet by mouth 2 (Two) Times a Day As Needed., Disp: , Rfl:     levothyroxine (SYNTHROID, LEVOTHROID) 75 MCG tablet, Take 1 tablet by mouth Daily., Disp: , Rfl:     metroNIDAZOLE (METROCREAM) 0.75 % cream, Apply 1 Application topically to the appropriate area as directed 2 (Two) Times a Day., Disp: , Rfl:     Mirabegron ER (MYRBETRIQ) 50 MG tablet sustained-release 24 hour 24 hr tablet, Take 50 mg by mouth Every Other Day., Disp: , Rfl:     Multiple Vitamins-Minerals (MULTIVITAMIN WITH MINERALS) tablet tablet, Take 1 tablet by mouth Daily., Disp: , Rfl:     simvastatin (ZOCOR) 20 MG tablet, Take 1 tablet by mouth Every Night., Disp: , Rfl:     Tacrolimus (PROGRAF PO), Take 10 mL by mouth 4 (Four) Times a Day. Tacrolimus Liquid 1mg/1,000mL  Swish and Spit 10mL QID (pt usually uses BID-TID) Verified per [facility] pharmacy where med is compounded. Instructed family to  bring med., Disp: , Rfl:   Allergies   Allergen Reactions    Clindamycin Hcl Itching    Tetracycline Itching    Bactrim [Sulfamethoxazole-Trimethoprim] Other (See Comments)     Unknown     Levofloxacin Other (See Comments)     Locked jaw after 1 pill     Social History     Socioeconomic History    Marital status:    Tobacco Use    Smoking status: Never     Passive exposure: Never    Smokeless tobacco: Never   Vaping Use    Vaping status: Never Used   Substance and Sexual Activity    Alcohol use: No    Drug use: No    Sexual activity: Not Currently     Review of Systems   Constitutional: Positive for malaise/fatigue.   Cardiovascular:  Positive for leg swelling. Negative for chest pain, dyspnea on exertion and palpitations.   Respiratory:  Positive for shortness of breath. Negative for cough.    Gastrointestinal:  Negative for abdominal pain, nausea and vomiting.   Neurological:  Negative for dizziness, headaches, light-headedness, numbness and weakness.   All other systems reviewed and are negative.             Objective:     Constitutional:       Appearance: Well-developed.   Eyes:      General: No scleral icterus.     Conjunctiva/sclera: Conjunctivae normal.   HENT:      Head: Normocephalic and atraumatic.   Neck:      Vascular: No carotid bruit or JVD.   Pulmonary:      Effort: Pulmonary effort is normal.      Breath sounds: Normal breath sounds. No wheezing. No rales.   Cardiovascular:      Normal rate. Regular rhythm.   Pulses:     Intact distal pulses.   Abdominal:      General: Bowel sounds are normal.      Palpations: Abdomen is soft.   Musculoskeletal:      Cervical back: Normal range of motion and neck supple. Skin:     General: Skin is warm and dry.      Findings: No rash.   Neurological:      Mental Status: Alert.       Procedures    Lab Review:         MDM    #1 coronary artery disease  Patient has nonobstructive disease in the past with normal function is currently stable without any angina and  is currently on aspirin 81 mg atenolol 25 mg and simvastatin 20 [place] once a day.  2.  Hypertension  Patient blood pressure currently stable on amlodipine 5 mg and atenolol 25 mg once a day  3.  Hyperlipidemia  Jacob on simvastatin 20 mg once a day and the lipid levels are well within normal limits    Patient's previous medical records, labs, and EKG were reviewed and discussed with the patient at today's visit.